# Patient Record
Sex: FEMALE | Race: WHITE | NOT HISPANIC OR LATINO
[De-identification: names, ages, dates, MRNs, and addresses within clinical notes are randomized per-mention and may not be internally consistent; named-entity substitution may affect disease eponyms.]

---

## 2017-01-17 ENCOUNTER — APPOINTMENT (OUTPATIENT)
Dept: PSYCHIATRY | Facility: CLINIC | Age: 52
End: 2017-01-17

## 2017-02-23 ENCOUNTER — APPOINTMENT (OUTPATIENT)
Dept: INTERNAL MEDICINE | Facility: CLINIC | Age: 52
End: 2017-02-23

## 2017-02-23 VITALS
HEIGHT: 65 IN | BODY MASS INDEX: 34.66 KG/M2 | SYSTOLIC BLOOD PRESSURE: 142 MMHG | WEIGHT: 208 LBS | DIASTOLIC BLOOD PRESSURE: 90 MMHG | HEART RATE: 93 BPM | OXYGEN SATURATION: 99 %

## 2017-02-23 DIAGNOSIS — R23.8 OTHER SKIN CHANGES: ICD-10-CM

## 2017-02-24 VITALS — DIASTOLIC BLOOD PRESSURE: 92 MMHG | SYSTOLIC BLOOD PRESSURE: 134 MMHG

## 2017-03-06 LAB
ALBUMIN SERPL ELPH-MCNC: 4.2 G/DL
ALP BLD-CCNC: 119 U/L
ALT SERPL-CCNC: 27 U/L
ANION GAP SERPL CALC-SCNC: 16 MMOL/L
APTT BLD: 31.8 SEC
AST SERPL-CCNC: 25 U/L
BASOPHILS # BLD AUTO: 0.02 K/UL
BASOPHILS NFR BLD AUTO: 0.2 %
BILIRUB SERPL-MCNC: 0.3 MG/DL
BUN SERPL-MCNC: 8 MG/DL
CALCIUM SERPL-MCNC: 9.1 MG/DL
CHLORIDE SERPL-SCNC: 104 MMOL/L
CHOLEST SERPL-MCNC: 279 MG/DL
CHOLEST/HDLC SERPL: 2.9 RATIO
CO2 SERPL-SCNC: 22 MMOL/L
CREAT SERPL-MCNC: 0.64 MG/DL
EOSINOPHIL # BLD AUTO: 0.4 K/UL
EOSINOPHIL NFR BLD AUTO: 4.7 %
GLUCOSE SERPL-MCNC: 103 MG/DL
HBA1C MFR BLD HPLC: 5.4 %
HCT VFR BLD CALC: 45 %
HDLC SERPL-MCNC: 96 MG/DL
HGB BLD-MCNC: 14.3 G/DL
IMM GRANULOCYTES NFR BLD AUTO: 0.1 %
INR PPP: 0.95 RATIO
LDLC SERPL CALC-MCNC: 161 MG/DL
LYMPHOCYTES # BLD AUTO: 2.05 K/UL
LYMPHOCYTES NFR BLD AUTO: 23.9 %
MAN DIFF?: NORMAL
MCHC RBC-ENTMCNC: 31.8 GM/DL
MCHC RBC-ENTMCNC: 32.9 PG
MCV RBC AUTO: 103.7 FL
MONOCYTES # BLD AUTO: 0.67 K/UL
MONOCYTES NFR BLD AUTO: 7.8 %
NEUTROPHILS # BLD AUTO: 5.41 K/UL
NEUTROPHILS NFR BLD AUTO: 63.3 %
PLATELET # BLD AUTO: 288 K/UL
POTASSIUM SERPL-SCNC: 5 MMOL/L
PROT SERPL-MCNC: 7.1 G/DL
PT BLD: 10.7 SEC
RBC # BLD: 4.34 M/UL
RBC # FLD: 13.5 %
SODIUM SERPL-SCNC: 142 MMOL/L
TRIGL SERPL-MCNC: 112 MG/DL
WBC # FLD AUTO: 8.56 K/UL

## 2017-03-22 ENCOUNTER — APPOINTMENT (OUTPATIENT)
Dept: INTERNAL MEDICINE | Facility: CLINIC | Age: 52
End: 2017-03-22

## 2017-03-22 VITALS — DIASTOLIC BLOOD PRESSURE: 98 MMHG | SYSTOLIC BLOOD PRESSURE: 132 MMHG

## 2017-03-22 VITALS
HEART RATE: 100 BPM | BODY MASS INDEX: 34.49 KG/M2 | DIASTOLIC BLOOD PRESSURE: 85 MMHG | OXYGEN SATURATION: 98 % | WEIGHT: 207 LBS | HEIGHT: 65 IN | SYSTOLIC BLOOD PRESSURE: 142 MMHG

## 2017-04-27 ENCOUNTER — RX RENEWAL (OUTPATIENT)
Age: 52
End: 2017-04-27

## 2017-05-07 ENCOUNTER — RX RENEWAL (OUTPATIENT)
Age: 52
End: 2017-05-07

## 2017-07-28 ENCOUNTER — APPOINTMENT (OUTPATIENT)
Dept: PSYCHIATRY | Facility: CLINIC | Age: 52
End: 2017-07-28

## 2017-07-28 ENCOUNTER — APPOINTMENT (OUTPATIENT)
Dept: INTERNAL MEDICINE | Facility: CLINIC | Age: 52
End: 2017-07-28

## 2017-08-01 ENCOUNTER — APPOINTMENT (OUTPATIENT)
Dept: PSYCHIATRY | Facility: CLINIC | Age: 52
End: 2017-08-01

## 2017-10-24 ENCOUNTER — MOBILE ON CALL (OUTPATIENT)
Age: 52
End: 2017-10-24

## 2018-01-03 ENCOUNTER — TRANSCRIPTION ENCOUNTER (OUTPATIENT)
Age: 53
End: 2018-01-03

## 2018-01-19 ENCOUNTER — APPOINTMENT (OUTPATIENT)
Dept: INTERNAL MEDICINE | Facility: CLINIC | Age: 53
End: 2018-01-19
Payer: COMMERCIAL

## 2018-01-19 VITALS
OXYGEN SATURATION: 98 % | WEIGHT: 209 LBS | SYSTOLIC BLOOD PRESSURE: 124 MMHG | HEART RATE: 110 BPM | DIASTOLIC BLOOD PRESSURE: 80 MMHG | BODY MASS INDEX: 34.82 KG/M2 | HEIGHT: 65 IN

## 2018-01-19 PROCEDURE — 99214 OFFICE O/P EST MOD 30 MIN: CPT | Mod: 25

## 2018-01-19 PROCEDURE — G0008: CPT

## 2018-01-19 PROCEDURE — 90686 IIV4 VACC NO PRSV 0.5 ML IM: CPT

## 2018-02-20 ENCOUNTER — APPOINTMENT (OUTPATIENT)
Dept: CARDIOLOGY | Facility: CLINIC | Age: 53
End: 2018-02-20
Payer: COMMERCIAL

## 2018-02-20 ENCOUNTER — NON-APPOINTMENT (OUTPATIENT)
Age: 53
End: 2018-02-20

## 2018-02-20 VITALS — DIASTOLIC BLOOD PRESSURE: 92 MMHG | SYSTOLIC BLOOD PRESSURE: 144 MMHG

## 2018-02-20 VITALS
HEIGHT: 65 IN | WEIGHT: 210 LBS | OXYGEN SATURATION: 97 % | DIASTOLIC BLOOD PRESSURE: 98 MMHG | BODY MASS INDEX: 34.99 KG/M2 | SYSTOLIC BLOOD PRESSURE: 137 MMHG | HEART RATE: 127 BPM

## 2018-02-20 DIAGNOSIS — F41.0 PANIC DISORDER [EPISODIC PAROXYSMAL ANXIETY]: ICD-10-CM

## 2018-02-20 DIAGNOSIS — F43.10 POST-TRAUMATIC STRESS DISORDER, UNSPECIFIED: ICD-10-CM

## 2018-02-20 DIAGNOSIS — Z87.891 PERSONAL HISTORY OF NICOTINE DEPENDENCE: ICD-10-CM

## 2018-02-20 DIAGNOSIS — Z86.59 PERSONAL HISTORY OF OTHER MENTAL AND BEHAVIORAL DISORDERS: ICD-10-CM

## 2018-02-20 DIAGNOSIS — Z81.8 FAMILY HISTORY OF OTHER MENTAL AND BEHAVIORAL DISORDERS: ICD-10-CM

## 2018-02-20 PROCEDURE — 99401 PREV MED CNSL INDIV APPRX 15: CPT

## 2018-02-20 PROCEDURE — 99245 OFF/OP CONSLTJ NEW/EST HI 55: CPT

## 2018-02-20 PROCEDURE — 93000 ELECTROCARDIOGRAM COMPLETE: CPT

## 2018-03-01 ENCOUNTER — APPOINTMENT (OUTPATIENT)
Dept: PSYCHIATRY | Facility: CLINIC | Age: 53
End: 2018-03-01
Payer: COMMERCIAL

## 2018-03-01 VITALS — DIASTOLIC BLOOD PRESSURE: 99 MMHG | SYSTOLIC BLOOD PRESSURE: 139 MMHG | HEART RATE: 105 BPM

## 2018-03-01 PROCEDURE — 99214 OFFICE O/P EST MOD 30 MIN: CPT

## 2018-03-05 ENCOUNTER — RX RENEWAL (OUTPATIENT)
Age: 53
End: 2018-03-05

## 2018-03-07 LAB
25(OH)D3 SERPL-MCNC: 4.5 NG/ML
ALBUMIN SERPL ELPH-MCNC: 4.1 G/DL
ALP BLD-CCNC: 118 U/L
ALT SERPL-CCNC: 40 U/L
ANION GAP SERPL CALC-SCNC: 14 MMOL/L
AST SERPL-CCNC: 34 U/L
BASOPHILS # BLD AUTO: 0.01 K/UL
BASOPHILS NFR BLD AUTO: 0.2 %
BILIRUB SERPL-MCNC: 0.2 MG/DL
BUN SERPL-MCNC: 6 MG/DL
CALCIUM SERPL-MCNC: 8.9 MG/DL
CHLORIDE SERPL-SCNC: 104 MMOL/L
CHOLEST SERPL-MCNC: 222 MG/DL
CHOLEST/HDLC SERPL: 2.7 RATIO
CO2 SERPL-SCNC: 25 MMOL/L
CREAT SERPL-MCNC: 0.57 MG/DL
EOSINOPHIL # BLD AUTO: 0.21 K/UL
EOSINOPHIL NFR BLD AUTO: 3.6 %
GLUCOSE SERPL-MCNC: 92 MG/DL
HBA1C MFR BLD HPLC: 5.4 %
HCT VFR BLD CALC: 42.6 %
HDLC SERPL-MCNC: 82 MG/DL
HGB BLD-MCNC: 13.7 G/DL
IMM GRANULOCYTES NFR BLD AUTO: 0.3 %
LDLC SERPL CALC-MCNC: 116 MG/DL
LYMPHOCYTES # BLD AUTO: 2.34 K/UL
LYMPHOCYTES NFR BLD AUTO: 40.4 %
MAN DIFF?: NORMAL
MCHC RBC-ENTMCNC: 32.2 GM/DL
MCHC RBC-ENTMCNC: 33.5 PG
MCV RBC AUTO: 104.2 FL
MONOCYTES # BLD AUTO: 0.56 K/UL
MONOCYTES NFR BLD AUTO: 9.7 %
NEUTROPHILS # BLD AUTO: 2.65 K/UL
NEUTROPHILS NFR BLD AUTO: 45.8 %
PLATELET # BLD AUTO: 293 K/UL
POTASSIUM SERPL-SCNC: 4.1 MMOL/L
PROT SERPL-MCNC: 6.9 G/DL
RBC # BLD: 4.09 M/UL
RBC # FLD: 13.3 %
SODIUM SERPL-SCNC: 143 MMOL/L
TRIGL SERPL-MCNC: 121 MG/DL
TSH SERPL-ACNC: 4.73 UIU/ML
WBC # FLD AUTO: 5.79 K/UL

## 2018-03-12 ENCOUNTER — NON-APPOINTMENT (OUTPATIENT)
Age: 53
End: 2018-03-12

## 2018-03-16 ENCOUNTER — OTHER (OUTPATIENT)
Age: 53
End: 2018-03-16

## 2018-04-05 ENCOUNTER — APPOINTMENT (OUTPATIENT)
Dept: CV DIAGNOSITCS | Facility: HOSPITAL | Age: 53
End: 2018-04-05

## 2018-04-05 ENCOUNTER — OUTPATIENT (OUTPATIENT)
Dept: OUTPATIENT SERVICES | Facility: HOSPITAL | Age: 53
LOS: 1 days | End: 2018-04-05
Payer: COMMERCIAL

## 2018-04-05 DIAGNOSIS — Z98.89 OTHER SPECIFIED POSTPROCEDURAL STATES: Chronic | ICD-10-CM

## 2018-04-05 DIAGNOSIS — I47.1 SUPRAVENTRICULAR TACHYCARDIA: ICD-10-CM

## 2018-04-05 DIAGNOSIS — S02.609A FRACTURE OF MANDIBLE, UNSPECIFIED, INITIAL ENCOUNTER FOR CLOSED FRACTURE: Chronic | ICD-10-CM

## 2018-04-05 PROCEDURE — 93306 TTE W/DOPPLER COMPLETE: CPT

## 2018-04-05 PROCEDURE — 93306 TTE W/DOPPLER COMPLETE: CPT | Mod: 26

## 2018-04-11 ENCOUNTER — APPOINTMENT (OUTPATIENT)
Dept: CARDIOLOGY | Facility: CLINIC | Age: 53
End: 2018-04-11

## 2018-04-16 ENCOUNTER — APPOINTMENT (OUTPATIENT)
Dept: INTERNAL MEDICINE | Facility: CLINIC | Age: 53
End: 2018-04-16
Payer: COMMERCIAL

## 2018-04-16 VITALS
OXYGEN SATURATION: 97 % | BODY MASS INDEX: 34.82 KG/M2 | HEIGHT: 65 IN | HEART RATE: 91 BPM | DIASTOLIC BLOOD PRESSURE: 80 MMHG | SYSTOLIC BLOOD PRESSURE: 136 MMHG | WEIGHT: 209 LBS

## 2018-04-16 VITALS — SYSTOLIC BLOOD PRESSURE: 122 MMHG | HEART RATE: 88 BPM | DIASTOLIC BLOOD PRESSURE: 80 MMHG

## 2018-04-16 DIAGNOSIS — E07.9 DISORDER OF THYROID, UNSPECIFIED: ICD-10-CM

## 2018-04-16 PROCEDURE — 36415 COLL VENOUS BLD VENIPUNCTURE: CPT

## 2018-04-16 PROCEDURE — 99214 OFFICE O/P EST MOD 30 MIN: CPT | Mod: 25

## 2018-04-23 ENCOUNTER — APPOINTMENT (OUTPATIENT)
Dept: PSYCHIATRY | Facility: CLINIC | Age: 53
End: 2018-04-23
Payer: COMMERCIAL

## 2018-04-23 LAB
25(OH)D3 SERPL-MCNC: 12.3 NG/ML
T4 FREE SERPL-MCNC: 0.9 NG/DL
THYROGLOB AB SERPL-ACNC: <20 IU/ML
THYROPEROXIDASE AB SERPL IA-ACNC: <10 IU/ML
TSH SERPL-ACNC: 4.74 UIU/ML

## 2018-04-23 PROCEDURE — 99214 OFFICE O/P EST MOD 30 MIN: CPT

## 2018-04-24 ENCOUNTER — TRANSCRIPTION ENCOUNTER (OUTPATIENT)
Age: 53
End: 2018-04-24

## 2018-04-27 LAB — HEMOCCULT STL QL IA: NEGATIVE

## 2018-05-03 ENCOUNTER — RX RENEWAL (OUTPATIENT)
Age: 53
End: 2018-05-03

## 2018-05-03 RX ORDER — VENLAFAXINE 37.5 MG/1
37.5 TABLET, EXTENDED RELEASE ORAL
Qty: 90 | Refills: 0 | Status: DISCONTINUED | COMMUNITY
Start: 2018-04-23 | End: 2018-05-03

## 2018-05-21 ENCOUNTER — RESULT REVIEW (OUTPATIENT)
Age: 53
End: 2018-05-21

## 2018-06-04 ENCOUNTER — APPOINTMENT (OUTPATIENT)
Dept: PSYCHIATRY | Facility: CLINIC | Age: 53
End: 2018-06-04
Payer: COMMERCIAL

## 2018-06-04 DIAGNOSIS — F41.9 ANXIETY DISORDER, UNSPECIFIED: ICD-10-CM

## 2018-06-04 DIAGNOSIS — F32.9 MAJOR DEPRESSIVE DISORDER, SINGLE EPISODE, UNSPECIFIED: ICD-10-CM

## 2018-06-04 PROCEDURE — 99214 OFFICE O/P EST MOD 30 MIN: CPT

## 2018-06-05 ENCOUNTER — FORM ENCOUNTER (OUTPATIENT)
Age: 53
End: 2018-06-05

## 2018-06-06 ENCOUNTER — APPOINTMENT (OUTPATIENT)
Dept: MAMMOGRAPHY | Facility: IMAGING CENTER | Age: 53
End: 2018-06-06
Payer: COMMERCIAL

## 2018-06-06 ENCOUNTER — OUTPATIENT (OUTPATIENT)
Dept: OUTPATIENT SERVICES | Facility: HOSPITAL | Age: 53
LOS: 1 days | End: 2018-06-06
Payer: COMMERCIAL

## 2018-06-06 ENCOUNTER — APPOINTMENT (OUTPATIENT)
Dept: ULTRASOUND IMAGING | Facility: IMAGING CENTER | Age: 53
End: 2018-06-06
Payer: COMMERCIAL

## 2018-06-06 DIAGNOSIS — N60.19 DIFFUSE CYSTIC MASTOPATHY OF UNSPECIFIED BREAST: ICD-10-CM

## 2018-06-06 DIAGNOSIS — Z98.89 OTHER SPECIFIED POSTPROCEDURAL STATES: Chronic | ICD-10-CM

## 2018-06-06 DIAGNOSIS — S02.609A FRACTURE OF MANDIBLE, UNSPECIFIED, INITIAL ENCOUNTER FOR CLOSED FRACTURE: Chronic | ICD-10-CM

## 2018-06-06 PROCEDURE — 76641 ULTRASOUND BREAST COMPLETE: CPT | Mod: 26,50

## 2018-06-06 PROCEDURE — 77067 SCR MAMMO BI INCL CAD: CPT | Mod: 26

## 2018-06-06 PROCEDURE — 77063 BREAST TOMOSYNTHESIS BI: CPT | Mod: 26

## 2018-06-06 PROCEDURE — 77063 BREAST TOMOSYNTHESIS BI: CPT

## 2018-06-06 PROCEDURE — 77067 SCR MAMMO BI INCL CAD: CPT

## 2018-06-06 PROCEDURE — 76641 ULTRASOUND BREAST COMPLETE: CPT

## 2018-07-16 ENCOUNTER — APPOINTMENT (OUTPATIENT)
Dept: PSYCHIATRY | Facility: CLINIC | Age: 53
End: 2018-07-16
Payer: COMMERCIAL

## 2018-07-16 VITALS — DIASTOLIC BLOOD PRESSURE: 86 MMHG | HEART RATE: 76 BPM | SYSTOLIC BLOOD PRESSURE: 125 MMHG

## 2018-07-16 PROCEDURE — 99214 OFFICE O/P EST MOD 30 MIN: CPT

## 2018-08-20 ENCOUNTER — RX RENEWAL (OUTPATIENT)
Age: 53
End: 2018-08-20

## 2018-09-10 ENCOUNTER — APPOINTMENT (OUTPATIENT)
Dept: PSYCHIATRY | Facility: CLINIC | Age: 53
End: 2018-09-10
Payer: COMMERCIAL

## 2018-09-10 VITALS — DIASTOLIC BLOOD PRESSURE: 89 MMHG | HEART RATE: 95 BPM | SYSTOLIC BLOOD PRESSURE: 135 MMHG

## 2018-09-10 PROCEDURE — 99214 OFFICE O/P EST MOD 30 MIN: CPT

## 2018-11-21 ENCOUNTER — RX RENEWAL (OUTPATIENT)
Age: 53
End: 2018-11-21

## 2018-12-03 ENCOUNTER — APPOINTMENT (OUTPATIENT)
Dept: PSYCHIATRY | Facility: CLINIC | Age: 53
End: 2018-12-03
Payer: COMMERCIAL

## 2018-12-03 DIAGNOSIS — I47.1 SUPRAVENTRICULAR TACHYCARDIA: ICD-10-CM

## 2018-12-03 PROCEDURE — 99214 OFFICE O/P EST MOD 30 MIN: CPT

## 2019-01-24 ENCOUNTER — RX RENEWAL (OUTPATIENT)
Age: 54
End: 2019-01-24

## 2019-02-25 ENCOUNTER — APPOINTMENT (OUTPATIENT)
Dept: PSYCHIATRY | Facility: CLINIC | Age: 54
End: 2019-02-25
Payer: COMMERCIAL

## 2019-02-25 PROCEDURE — 99214 OFFICE O/P EST MOD 30 MIN: CPT

## 2019-02-28 ENCOUNTER — RX RENEWAL (OUTPATIENT)
Age: 54
End: 2019-02-28

## 2019-04-16 ENCOUNTER — APPOINTMENT (OUTPATIENT)
Dept: CARDIOLOGY | Facility: CLINIC | Age: 54
End: 2019-04-16

## 2019-06-06 ENCOUNTER — APPOINTMENT (OUTPATIENT)
Dept: PSYCHIATRY | Facility: CLINIC | Age: 54
End: 2019-06-06
Payer: COMMERCIAL

## 2019-06-06 DIAGNOSIS — F33.41 MAJOR DEPRESSIVE DISORDER, RECURRENT, IN PARTIAL REMISSION: ICD-10-CM

## 2019-06-06 DIAGNOSIS — F43.21 ADJUSTMENT DISORDER WITH DEPRESSED MOOD: ICD-10-CM

## 2019-06-06 PROCEDURE — 99214 OFFICE O/P EST MOD 30 MIN: CPT

## 2019-06-14 ENCOUNTER — APPOINTMENT (OUTPATIENT)
Dept: SURGICAL ONCOLOGY | Facility: CLINIC | Age: 54
End: 2019-06-14
Payer: COMMERCIAL

## 2019-06-14 VITALS
OXYGEN SATURATION: 98 % | BODY MASS INDEX: 32.99 KG/M2 | HEART RATE: 101 BPM | SYSTOLIC BLOOD PRESSURE: 137 MMHG | DIASTOLIC BLOOD PRESSURE: 95 MMHG | WEIGHT: 198 LBS | HEIGHT: 65 IN

## 2019-06-14 PROCEDURE — 99244 OFF/OP CNSLTJ NEW/EST MOD 40: CPT

## 2019-06-14 NOTE — ADDENDUM
[FreeTextEntry1] : I, Joseph Meehan, acted solely as a scribe for Dr. David Kate on this date 06/14/2019.

## 2019-06-14 NOTE — OB HISTORY
[Postmenopausal] : The patient is postmenopausal [Menarche Age ____] : menarche age [unfilled] [Menopause Age____] : age at menopause was [unfilled] [Approximately ___] : the LMP was approximately [unfilled]

## 2019-06-14 NOTE — HISTORY OF PRESENT ILLNESS
[de-identified] : 54 y/o female presents for initial consultation for annual breast exam. \par Patient last seen in 2015. \par \par Screening Bilateral MMG/US 6/6/2018: No mammographic or sonographic evidence of malignancy. BIRADS-1. \par \par Today she is without any complaints. \par Denies palpable breast masses, nipple discharge, nipple retraction/inversion, or skin changes. \par Denies breast pain. \par Denies constitutional symptoms. \par Denies fever or chills. \par She reports one previous benign left breast excisional biopsy.\par FMHx: mother and maternal aunt had breast cancer.

## 2019-06-14 NOTE — CONSULT LETTER
[Dear  ___] : Dear  [unfilled], [Consult Letter:] : I had the pleasure of evaluating your patient, [unfilled]. [Please see my note below.] : Please see my note below. [Sincerely,] : Sincerely, [FreeTextEntry3] : David Kate MD FACS

## 2019-06-14 NOTE — ASSESSMENT
[FreeTextEntry1] : Fibrocystic changes \par S/p benign left breast surgical biopsy in the past\par Normal physical examination \par BIRADS-1 breast imaging June 2018 \par Will get yearly breast imaging now \par RTO 1 year

## 2019-06-14 NOTE — PHYSICAL EXAM
[Normal] : supple, no neck mass and thyroid not enlarged [Normal Supraclavicular Lymph Nodes] : normal supraclavicular lymph nodes [Normal Neck Lymph Nodes] : normal neck lymph nodes  [Normal Groin Lymph Nodes] : normal groin lymph nodes [Normal Axillary Lymph Nodes] : normal axillary lymph nodes [Normal] : oriented to person, place and time, with appropriate affect [de-identified] : Right breast scar well healed. No masses or adenopathy bilaterally.

## 2019-06-30 ENCOUNTER — FORM ENCOUNTER (OUTPATIENT)
Age: 54
End: 2019-06-30

## 2019-07-01 ENCOUNTER — OUTPATIENT (OUTPATIENT)
Dept: OUTPATIENT SERVICES | Facility: HOSPITAL | Age: 54
LOS: 1 days | End: 2019-07-01
Payer: COMMERCIAL

## 2019-07-01 ENCOUNTER — APPOINTMENT (OUTPATIENT)
Dept: MAMMOGRAPHY | Facility: CLINIC | Age: 54
End: 2019-07-01
Payer: COMMERCIAL

## 2019-07-01 ENCOUNTER — FORM ENCOUNTER (OUTPATIENT)
Age: 54
End: 2019-07-01

## 2019-07-01 ENCOUNTER — APPOINTMENT (OUTPATIENT)
Dept: ULTRASOUND IMAGING | Facility: CLINIC | Age: 54
End: 2019-07-01
Payer: COMMERCIAL

## 2019-07-01 DIAGNOSIS — Z98.89 OTHER SPECIFIED POSTPROCEDURAL STATES: Chronic | ICD-10-CM

## 2019-07-01 DIAGNOSIS — S02.609A FRACTURE OF MANDIBLE, UNSPECIFIED, INITIAL ENCOUNTER FOR CLOSED FRACTURE: Chronic | ICD-10-CM

## 2019-07-01 DIAGNOSIS — N60.19 DIFFUSE CYSTIC MASTOPATHY OF UNSPECIFIED BREAST: ICD-10-CM

## 2019-07-01 DIAGNOSIS — Z00.8 ENCOUNTER FOR OTHER GENERAL EXAMINATION: ICD-10-CM

## 2019-07-01 PROCEDURE — 76641 ULTRASOUND BREAST COMPLETE: CPT | Mod: 26,50

## 2019-07-01 PROCEDURE — 77063 BREAST TOMOSYNTHESIS BI: CPT

## 2019-07-01 PROCEDURE — 77067 SCR MAMMO BI INCL CAD: CPT

## 2019-07-01 PROCEDURE — 77063 BREAST TOMOSYNTHESIS BI: CPT | Mod: 26

## 2019-07-01 PROCEDURE — 77067 SCR MAMMO BI INCL CAD: CPT | Mod: 26

## 2019-07-01 PROCEDURE — 76641 ULTRASOUND BREAST COMPLETE: CPT

## 2019-07-02 ENCOUNTER — APPOINTMENT (OUTPATIENT)
Dept: ULTRASOUND IMAGING | Facility: CLINIC | Age: 54
End: 2019-07-02

## 2019-07-02 ENCOUNTER — APPOINTMENT (OUTPATIENT)
Dept: MAMMOGRAPHY | Facility: CLINIC | Age: 54
End: 2019-07-02

## 2019-07-02 ENCOUNTER — OUTPATIENT (OUTPATIENT)
Dept: OUTPATIENT SERVICES | Facility: HOSPITAL | Age: 54
LOS: 1 days | End: 2019-07-02
Payer: COMMERCIAL

## 2019-07-02 DIAGNOSIS — Z98.89 OTHER SPECIFIED POSTPROCEDURAL STATES: Chronic | ICD-10-CM

## 2019-07-02 DIAGNOSIS — S02.609A FRACTURE OF MANDIBLE, UNSPECIFIED, INITIAL ENCOUNTER FOR CLOSED FRACTURE: Chronic | ICD-10-CM

## 2019-07-02 DIAGNOSIS — Z00.8 ENCOUNTER FOR OTHER GENERAL EXAMINATION: ICD-10-CM

## 2019-07-02 PROCEDURE — 76642 ULTRASOUND BREAST LIMITED: CPT

## 2019-07-02 PROCEDURE — 76642 ULTRASOUND BREAST LIMITED: CPT | Mod: 26,50

## 2019-07-03 ENCOUNTER — RESULT REVIEW (OUTPATIENT)
Age: 54
End: 2019-07-03

## 2019-07-05 ENCOUNTER — APPOINTMENT (OUTPATIENT)
Dept: ULTRASOUND IMAGING | Facility: CLINIC | Age: 54
End: 2019-07-05

## 2019-08-16 ENCOUNTER — APPOINTMENT (OUTPATIENT)
Dept: INTERNAL MEDICINE | Facility: CLINIC | Age: 54
End: 2019-08-16
Payer: COMMERCIAL

## 2019-08-16 VITALS
HEART RATE: 106 BPM | BODY MASS INDEX: 31.32 KG/M2 | SYSTOLIC BLOOD PRESSURE: 130 MMHG | DIASTOLIC BLOOD PRESSURE: 86 MMHG | HEIGHT: 65 IN | TEMPERATURE: 98.2 F | WEIGHT: 188 LBS | OXYGEN SATURATION: 99 %

## 2019-08-16 PROCEDURE — 99396 PREV VISIT EST AGE 40-64: CPT

## 2019-08-16 RX ORDER — PROPRANOLOL HYDROCHLORIDE 10 MG/1
10 TABLET ORAL 3 TIMES DAILY
Qty: 21 | Refills: 0 | Status: DISCONTINUED | COMMUNITY
Start: 2018-06-04 | End: 2019-08-16

## 2019-08-16 RX ORDER — ERGOCALCIFEROL 1.25 MG/1
1.25 MG CAPSULE, LIQUID FILLED ORAL
Qty: 16 | Refills: 0 | Status: DISCONTINUED | COMMUNITY
Start: 2018-03-07 | End: 2019-08-16

## 2019-08-16 RX ORDER — ERGOCALCIFEROL 1.25 MG/1
1.25 MG CAPSULE, LIQUID FILLED ORAL
Qty: 8 | Refills: 0 | Status: DISCONTINUED | COMMUNITY
Start: 2017-02-23 | End: 2019-08-16

## 2019-08-19 NOTE — HEALTH RISK ASSESSMENT
[Very Good] : ~his/her~  mood as very good [Yes] : Yes [Patient declined colonoscopy] : Patient declined colonoscopy [HIV test declined] : HIV test declined [Hepatitis C test declined] : Hepatitis C test declined [None] : None [Alone] : lives alone [Employed] : employed [Single] : single [Feels Safe at Home] : Feels safe at home [Fully functional (bathing, dressing, toileting, transferring, walking, feeding)] : Fully functional (bathing, dressing, toileting, transferring, walking, feeding) [Fully functional (using the telephone, shopping, preparing meals, housekeeping, doing laundry, using] : Fully functional and needs no help or supervision to perform IADLs (using the telephone, shopping, preparing meals, housekeeping, doing laundry, using transportation, managing medications and managing finances) [Reports changes in vision] : Reports changes in vision [Smoke Detector] : smoke detector [Carbon Monoxide Detector] : carbon monoxide detector [Safety elements used in home] : safety elements used in home [Seat Belt] :  uses seat belt [Sunscreen] : uses sunscreen [With Patient/Caregiver] : With Patient/Caregiver [Name: ___] : Health Care Proxy's Name: [unfilled]  [Relationship: ___] : Relationship: [unfilled] [No falls in past year] : Patient reported no falls in the past year [0] : 2) Feeling down, depressed, or hopeless: Not at all (0) [Patient reported mammogram was normal] : Patient reported mammogram was normal [Patient declined PAP Smear] : Patient declined PAP Smear [# Of Children ___] : has [unfilled] children [] : No [de-identified] : quit 23 yrs ago (1 PPD since 13 yo) [de-identified] : 2-3/d on wkds w dinner , not during wk [Change in mental status noted] : No change in mental status noted [Language] : denies difficulty with language [Reports changes in hearing] : Reports no changes in hearing [Sexually Active] : not sexually active [Reports changes in dental health] : Reports no changes in dental health [Guns at Home] : no guns at home [Travel to Developing Areas] : does not  travel to developing areas [Caregiver Concerns] : does not have caregiver concerns [MammogramDate] : 07/19 [de-identified] : no pets [de-identified] : needs reading glasses [FreeTextEntry2] :  [AdvancecareDate] : 08/16/19 [FreeTextEntry4] : will discuss

## 2019-08-19 NOTE — PHYSICAL EXAM
[No Acute Distress] : no acute distress [Well Nourished] : well nourished [Well Developed] : well developed [Well-Appearing] : well-appearing [Normal Sclera/Conjunctiva] : normal sclera/conjunctiva [PERRL] : pupils equal round and reactive to light [EOMI] : extraocular movements intact [Normal Outer Ear/Nose] : the outer ears and nose were normal in appearance [Normal Oropharynx] : the oropharynx was normal [No JVD] : no jugular venous distention [Supple] : supple [No Lymphadenopathy] : no lymphadenopathy [Thyroid Normal, No Nodules] : the thyroid was normal and there were no nodules present [No Respiratory Distress] : no respiratory distress  [Clear to Auscultation] : lungs were clear to auscultation bilaterally [No Accessory Muscle Use] : no accessory muscle use [Normal Rate] : normal rate  [No Murmur] : no murmur heard [Regular Rhythm] : with a regular rhythm [Normal S1, S2] : normal S1 and S2 [No Carotid Bruits] : no carotid bruits [No Abdominal Bruit] : a ~M bruit was not heard ~T in the abdomen [No Varicosities] : no varicosities [Pedal Pulses Present] : the pedal pulses are present [No Edema] : there was no peripheral edema [No Palpable Aorta] : no palpable aorta [Normal Appearance] : normal in appearance [No Extremity Clubbing/Cyanosis] : no extremity clubbing/cyanosis [No Axillary Lymphadenopathy] : no axillary lymphadenopathy [Soft] : abdomen soft [Non-distended] : non-distended [Non Tender] : non-tender [No HSM] : no HSM [No Masses] : no abdominal mass palpated [Normal Posterior Cervical Nodes] : no posterior cervical lymphadenopathy [Normal Bowel Sounds] : normal bowel sounds [No CVA Tenderness] : no CVA  tenderness [Normal Anterior Cervical Nodes] : no anterior cervical lymphadenopathy [No Spinal Tenderness] : no spinal tenderness [Grossly Normal Strength/Tone] : grossly normal strength/tone [No Joint Swelling] : no joint swelling [No Rash] : no rash [Coordination Grossly Intact] : coordination grossly intact [No Focal Deficits] : no focal deficits [Normal Gait] : normal gait [Deep Tendon Reflexes (DTR)] : deep tendon reflexes were 2+ and symmetric [Normal Affect] : the affect was normal [Normal Insight/Judgement] : insight and judgment were intact

## 2019-08-19 NOTE — HISTORY OF PRESENT ILLNESS
[FreeTextEntry1] : cpe [de-identified] : 55 yo woman w depression/anxiety/PTSD, ;\par htn, HLD, tachycardia\par overwt, fibrocystic breasts;\par f/h breast/ovar ca.\par \par Recently lost a good amt of wt thru diet/exer and feeling well.\par

## 2019-08-22 LAB
25(OH)D3 SERPL-MCNC: 4.1 NG/ML
ALBUMIN SERPL ELPH-MCNC: 4.1 G/DL
ALP BLD-CCNC: 207 U/L
ALT SERPL-CCNC: 81 U/L
ANION GAP SERPL CALC-SCNC: 21 MMOL/L
AST SERPL-CCNC: 141 U/L
BASOPHILS # BLD AUTO: 0.04 K/UL
BASOPHILS NFR BLD AUTO: 0.5 %
BILIRUB SERPL-MCNC: 0.5 MG/DL
BUN SERPL-MCNC: 5 MG/DL
CALCIUM SERPL-MCNC: 8.8 MG/DL
CHLORIDE SERPL-SCNC: 94 MMOL/L
CHOLEST SERPL-MCNC: 293 MG/DL
CHOLEST/HDLC SERPL: 2.1 RATIO
CO2 SERPL-SCNC: 24 MMOL/L
CREAT SERPL-MCNC: 0.53 MG/DL
EOSINOPHIL # BLD AUTO: 0.2 K/UL
EOSINOPHIL NFR BLD AUTO: 2.6 %
ESTIMATED AVERAGE GLUCOSE: 103 MG/DL
GLUCOSE SERPL-MCNC: 94 MG/DL
HBA1C MFR BLD HPLC: 5.2 %
HCT VFR BLD CALC: 48.1 %
HDLC SERPL-MCNC: 141 MG/DL
HGB BLD-MCNC: 16.1 G/DL
IMM GRANULOCYTES NFR BLD AUTO: 0.4 %
LDLC SERPL CALC-MCNC: 135 MG/DL
LYMPHOCYTES # BLD AUTO: 1.94 K/UL
LYMPHOCYTES NFR BLD AUTO: 25.3 %
MAN DIFF?: NORMAL
MCHC RBC-ENTMCNC: 33.5 GM/DL
MCHC RBC-ENTMCNC: 35 PG
MCV RBC AUTO: 104.6 FL
MONOCYTES # BLD AUTO: 0.66 K/UL
MONOCYTES NFR BLD AUTO: 8.6 %
NEUTROPHILS # BLD AUTO: 4.81 K/UL
NEUTROPHILS NFR BLD AUTO: 62.6 %
PLATELET # BLD AUTO: 294 K/UL
POTASSIUM SERPL-SCNC: 4.2 MMOL/L
PROT SERPL-MCNC: 6.9 G/DL
RBC # BLD: 4.6 M/UL
RBC # FLD: 13.2 %
SODIUM SERPL-SCNC: 139 MMOL/L
TRIGL SERPL-MCNC: 84 MG/DL
TSH SERPL-ACNC: 3.94 UIU/ML
VIT B12 SERPL-MCNC: 684 PG/ML
WBC # FLD AUTO: 7.68 K/UL

## 2019-08-26 LAB — HEMOCCULT STL QL IA: NEGATIVE

## 2019-08-27 ENCOUNTER — APPOINTMENT (OUTPATIENT)
Dept: PSYCHIATRY | Facility: CLINIC | Age: 54
End: 2019-08-27

## 2019-08-30 LAB
ALBUMIN SERPL ELPH-MCNC: 3.8 G/DL
ALP BLD-CCNC: 119 U/L
ALP BONE SERPL-MCNC: 16 MCG/L
ALT SERPL-CCNC: 35 U/L
ANION GAP SERPL CALC-SCNC: 13 MMOL/L
AST SERPL-CCNC: 36 U/L
BILIRUB SERPL-MCNC: 0.6 MG/DL
BUN SERPL-MCNC: 3 MG/DL
CALCIUM SERPL-MCNC: 9 MG/DL
CHLORIDE SERPL-SCNC: 100 MMOL/L
CHOLEST SERPL-MCNC: 205 MG/DL
CHOLEST/HDLC SERPL: 2.2 RATIO
CO2 SERPL-SCNC: 26 MMOL/L
CREAT SERPL-MCNC: 0.58 MG/DL
GLUCOSE SERPL-MCNC: 106 MG/DL
HBV SURFACE AB SER QL: NONREACTIVE
HBV SURFACE AG SER QL: NONREACTIVE
HCV AB SER QL: NONREACTIVE
HCV S/CO RATIO: 0.11 S/CO
HDLC SERPL-MCNC: 92 MG/DL
LDLC SERPL CALC-MCNC: 96 MG/DL
POTASSIUM SERPL-SCNC: 4.1 MMOL/L
PROT SERPL-MCNC: 6.5 G/DL
SODIUM SERPL-SCNC: 138 MMOL/L
TRIGL SERPL-MCNC: 84 MG/DL

## 2019-10-24 ENCOUNTER — APPOINTMENT (OUTPATIENT)
Dept: PSYCHIATRY | Facility: CLINIC | Age: 54
End: 2019-10-24

## 2020-02-04 ENCOUNTER — RX RENEWAL (OUTPATIENT)
Age: 55
End: 2020-02-04

## 2020-06-09 ENCOUNTER — APPOINTMENT (OUTPATIENT)
Dept: INTERNAL MEDICINE | Facility: CLINIC | Age: 55
End: 2020-06-09
Payer: COMMERCIAL

## 2020-06-09 DIAGNOSIS — E55.9 VITAMIN D DEFICIENCY, UNSPECIFIED: ICD-10-CM

## 2020-06-09 PROCEDURE — 99213 OFFICE O/P EST LOW 20 MIN: CPT | Mod: 95

## 2020-06-09 NOTE — HISTORY OF PRESENT ILLNESS
[Home] : at home, [unfilled] , at the time of the visit. [Medical Office: ([facility])___] : at the medical office located in  [Verbal consent obtained from patient] : the patient, [unfilled] [FreeTextEntry8] : 55 yo woman w Htn, HLD, tachycardia, overwt, fibrocyst breasts, h/o breast biopsies,  f/h breast ca.\par Low vit D .\par Acute issue:pain and limited ROM right shoulder x sev mos. She is left-handed. Doesn't recall any trauma or strain to shoulder.\par Initially had pain in wrist traveling up towards neck, due to wrong position with iPad while working from home. Saw her Chiro , that resolved. Has not  used any tylenol or advil etc. \par She would like to see Ortho Dr. Price.\par She is due for her Mammo/US\par Due for labs - elev lipids, elev LFTs, vit D 4 in the past.\par She lost 30 lb so far on plant-based diet on her own. wt = 175 , feels she has plateaued during pandemic.\par \par Staying safe duringCovid pandemic.\par Just before lockdown she was on flights to [place], had to turn around  in airport and come right back then self-quarantined; did not become sick.

## 2020-06-09 NOTE — ASSESSMENT
[FreeTextEntry1] : Pt w possible right  rotator cuff tear. \par She will see her Ortho Dr. Price.\par Otherwise doing well. Losing wt nicely, walking for exercise; working from home.\par Encouraged to drink more water, w rafiq.\par Mammo/US\par labs\par f/u 7.20.\par

## 2020-06-15 ENCOUNTER — APPOINTMENT (OUTPATIENT)
Dept: ORTHOPEDIC SURGERY | Facility: CLINIC | Age: 55
End: 2020-06-15
Payer: COMMERCIAL

## 2020-06-15 VITALS
SYSTOLIC BLOOD PRESSURE: 100 MMHG | WEIGHT: 175 LBS | DIASTOLIC BLOOD PRESSURE: 71 MMHG | HEART RATE: 72 BPM | HEIGHT: 65 IN | TEMPERATURE: 97.3 F | BODY MASS INDEX: 29.16 KG/M2

## 2020-06-15 DIAGNOSIS — M25.511 PAIN IN RIGHT SHOULDER: ICD-10-CM

## 2020-06-15 PROCEDURE — 99204 OFFICE O/P NEW MOD 45 MIN: CPT

## 2020-06-15 PROCEDURE — 72040 X-RAY EXAM NECK SPINE 2-3 VW: CPT

## 2020-06-15 PROCEDURE — 73030 X-RAY EXAM OF SHOULDER: CPT | Mod: RT

## 2020-07-01 ENCOUNTER — RX RENEWAL (OUTPATIENT)
Age: 55
End: 2020-07-01

## 2020-07-06 ENCOUNTER — APPOINTMENT (OUTPATIENT)
Dept: INTERNAL MEDICINE | Facility: CLINIC | Age: 55
End: 2020-07-06

## 2020-07-08 ENCOUNTER — APPOINTMENT (OUTPATIENT)
Dept: MAMMOGRAPHY | Facility: IMAGING CENTER | Age: 55
End: 2020-07-08
Payer: COMMERCIAL

## 2020-07-08 ENCOUNTER — OUTPATIENT (OUTPATIENT)
Dept: OUTPATIENT SERVICES | Facility: HOSPITAL | Age: 55
LOS: 1 days | End: 2020-07-08
Payer: COMMERCIAL

## 2020-07-08 ENCOUNTER — RESULT REVIEW (OUTPATIENT)
Age: 55
End: 2020-07-08

## 2020-07-08 ENCOUNTER — APPOINTMENT (OUTPATIENT)
Dept: ULTRASOUND IMAGING | Facility: IMAGING CENTER | Age: 55
End: 2020-07-08
Payer: COMMERCIAL

## 2020-07-08 DIAGNOSIS — Z00.8 ENCOUNTER FOR OTHER GENERAL EXAMINATION: ICD-10-CM

## 2020-07-08 DIAGNOSIS — Z98.89 OTHER SPECIFIED POSTPROCEDURAL STATES: Chronic | ICD-10-CM

## 2020-07-08 DIAGNOSIS — N60.19 DIFFUSE CYSTIC MASTOPATHY OF UNSPECIFIED BREAST: ICD-10-CM

## 2020-07-08 DIAGNOSIS — Z00.00 ENCOUNTER FOR GENERAL ADULT MEDICAL EXAMINATION WITHOUT ABNORMAL FINDINGS: ICD-10-CM

## 2020-07-08 DIAGNOSIS — S02.609A FRACTURE OF MANDIBLE, UNSPECIFIED, INITIAL ENCOUNTER FOR CLOSED FRACTURE: Chronic | ICD-10-CM

## 2020-07-08 PROCEDURE — 76641 ULTRASOUND BREAST COMPLETE: CPT

## 2020-07-08 PROCEDURE — 77067 SCR MAMMO BI INCL CAD: CPT

## 2020-07-08 PROCEDURE — 77067 SCR MAMMO BI INCL CAD: CPT | Mod: 26

## 2020-07-08 PROCEDURE — 77063 BREAST TOMOSYNTHESIS BI: CPT | Mod: 26

## 2020-07-08 PROCEDURE — 77063 BREAST TOMOSYNTHESIS BI: CPT

## 2020-07-08 PROCEDURE — 76641 ULTRASOUND BREAST COMPLETE: CPT | Mod: 26,50

## 2020-07-17 ENCOUNTER — APPOINTMENT (OUTPATIENT)
Dept: ORTHOPEDIC SURGERY | Facility: CLINIC | Age: 55
End: 2020-07-17

## 2020-07-29 ENCOUNTER — APPOINTMENT (OUTPATIENT)
Dept: SURGICAL ONCOLOGY | Facility: CLINIC | Age: 55
End: 2020-07-29
Payer: COMMERCIAL

## 2020-07-29 VITALS
DIASTOLIC BLOOD PRESSURE: 72 MMHG | TEMPERATURE: 97.9 F | HEART RATE: 90 BPM | SYSTOLIC BLOOD PRESSURE: 104 MMHG | OXYGEN SATURATION: 97 % | RESPIRATION RATE: 14 BRPM

## 2020-07-29 PROCEDURE — 99214 OFFICE O/P EST MOD 30 MIN: CPT

## 2020-07-29 NOTE — CONSULT LETTER
[Dear  ___] : Dear  [unfilled], [Please see my note below.] : Please see my note below. [Consult Letter:] : I had the pleasure of evaluating your patient, [unfilled]. [Sincerely,] : Sincerely, [FreeTextEntry3] : David Kate MD FACS

## 2020-07-29 NOTE — ASSESSMENT
[FreeTextEntry1] : Strong family history of breast cancer. \par Lifetime risk 31%\par BIRADS-2/1 breast imaging June 2020.\par Will hold off off on breast MRI until January 2021 as pt has a frozen shoulder. \par RTO 6 mos\par \par

## 2020-07-29 NOTE — PHYSICAL EXAM
[Normal] : supple, no neck mass and thyroid not enlarged [Normal Neck Lymph Nodes] : normal neck lymph nodes  [Normal Supraclavicular Lymph Nodes] : normal supraclavicular lymph nodes [Normal Axillary Lymph Nodes] : normal axillary lymph nodes [Normal Groin Lymph Nodes] : normal groin lymph nodes [Normal] : oriented to person, place and time, with appropriate affect [de-identified] : Left breast scar well healed. No masses or adenopathy bilaterally.

## 2020-07-29 NOTE — HISTORY OF PRESENT ILLNESS
[de-identified] : 52 y/o female presents for a follow up for annual breast exam. \par \par Screening Bilateral MMG/US 6/22/20: No mammographic or sonographic evidence of malignancy. BIRADS-2 \par Screening Bilateral MMG/US 6/6/2018: No mammographic or sonographic evidence of malignancy. BIRADS-1. \par \par Notes she has right shoulder stiffness. Is seen by Dr. Sparrow. \par \par Today she is without complaints. \par Denies palpable breast masses, nipple discharge, nipple retraction/inversion, or skin changes. \par Denies breast pain. \par Denies constitutional symptoms. \par Denies fever or chills. \par She reports one previous benign left breast excisional biopsy.\par FMHx: mother and maternal aunt had breast cancer.

## 2020-08-25 ENCOUNTER — APPOINTMENT (OUTPATIENT)
Dept: INTERNAL MEDICINE | Facility: CLINIC | Age: 55
End: 2020-08-25
Payer: COMMERCIAL

## 2020-08-25 VITALS
HEIGHT: 65 IN | HEART RATE: 86 BPM | TEMPERATURE: 97.5 F | DIASTOLIC BLOOD PRESSURE: 78 MMHG | OXYGEN SATURATION: 99 % | SYSTOLIC BLOOD PRESSURE: 115 MMHG | WEIGHT: 180 LBS | BODY MASS INDEX: 29.99 KG/M2

## 2020-08-25 LAB
25(OH)D3 SERPL-MCNC: 19.3 NG/ML
ALBUMIN SERPL ELPH-MCNC: 4.4 G/DL
ALP BLD-CCNC: 134 U/L
ALT SERPL-CCNC: 26 U/L
ANION GAP SERPL CALC-SCNC: 13 MMOL/L
AST SERPL-CCNC: 19 U/L
BASOPHILS # BLD AUTO: 0.04 K/UL
BASOPHILS NFR BLD AUTO: 0.6 %
BILIRUB SERPL-MCNC: 0.3 MG/DL
BUN SERPL-MCNC: 8 MG/DL
CALCIUM SERPL-MCNC: 9.6 MG/DL
CHLORIDE SERPL-SCNC: 106 MMOL/L
CHOLEST SERPL-MCNC: 189 MG/DL
CHOLEST/HDLC SERPL: 3.2 RATIO
CO2 SERPL-SCNC: 25 MMOL/L
CREAT SERPL-MCNC: 0.67 MG/DL
EOSINOPHIL # BLD AUTO: 0.23 K/UL
EOSINOPHIL NFR BLD AUTO: 3.6 %
ESTIMATED AVERAGE GLUCOSE: 97 MG/DL
GLUCOSE SERPL-MCNC: 95 MG/DL
HBA1C MFR BLD HPLC: 5 %
HCT VFR BLD CALC: 41.2 %
HDLC SERPL-MCNC: 58 MG/DL
HGB BLD-MCNC: 13.5 G/DL
IMM GRANULOCYTES NFR BLD AUTO: 0.2 %
LDLC SERPL CALC-MCNC: 115 MG/DL
LYMPHOCYTES # BLD AUTO: 2 K/UL
LYMPHOCYTES NFR BLD AUTO: 31.3 %
MAN DIFF?: NORMAL
MCHC RBC-ENTMCNC: 30.8 PG
MCHC RBC-ENTMCNC: 32.8 GM/DL
MCV RBC AUTO: 93.8 FL
MONOCYTES # BLD AUTO: 0.48 K/UL
MONOCYTES NFR BLD AUTO: 7.5 %
NEUTROPHILS # BLD AUTO: 3.64 K/UL
NEUTROPHILS NFR BLD AUTO: 56.8 %
PLATELET # BLD AUTO: 299 K/UL
POTASSIUM SERPL-SCNC: 4.7 MMOL/L
PROT SERPL-MCNC: 6.5 G/DL
RBC # BLD: 4.39 M/UL
RBC # FLD: 12.4 %
SODIUM SERPL-SCNC: 144 MMOL/L
TRIGL SERPL-MCNC: 79 MG/DL
TSH SERPL-ACNC: 9.25 UIU/ML
WBC # FLD AUTO: 6.4 K/UL

## 2020-08-25 PROCEDURE — 99213 OFFICE O/P EST LOW 20 MIN: CPT

## 2020-08-25 RX ORDER — LEVOTHYROXINE SODIUM 0.05 MG/1
50 TABLET ORAL
Qty: 90 | Refills: 3 | Status: ACTIVE | COMMUNITY
Start: 2020-08-25 | End: 1900-01-01

## 2020-08-25 NOTE — ASSESSMENT
[FreeTextEntry1] : New hypothy\par repeat labs and abs\par covid\par start synthr 50\par f/u 6-8 wks

## 2020-08-25 NOTE — PHYSICAL EXAM
[No Acute Distress] : no acute distress [Well-Appearing] : well-appearing [Well Developed] : well developed [Well Nourished] : well nourished [PERRL] : pupils equal round and reactive to light [Normal Sclera/Conjunctiva] : normal sclera/conjunctiva [EOMI] : extraocular movements intact [Normal Outer Ear/Nose] : the outer ears and nose were normal in appearance [Normal Oropharynx] : the oropharynx was normal [No JVD] : no jugular venous distention [No Lymphadenopathy] : no lymphadenopathy [Supple] : supple [Thyroid Normal, No Nodules] : the thyroid was normal and there were no nodules present [No Respiratory Distress] : no respiratory distress  [No Accessory Muscle Use] : no accessory muscle use [Clear to Auscultation] : lungs were clear to auscultation bilaterally [Normal Rate] : normal rate  [Normal S1, S2] : normal S1 and S2 [Regular Rhythm] : with a regular rhythm [No Murmur] : no murmur heard [No Carotid Bruits] : no carotid bruits [No Abdominal Bruit] : a ~M bruit was not heard ~T in the abdomen [Pedal Pulses Present] : the pedal pulses are present [No Varicosities] : no varicosities [No Edema] : there was no peripheral edema [Soft] : abdomen soft [No Extremity Clubbing/Cyanosis] : no extremity clubbing/cyanosis [No Palpable Aorta] : no palpable aorta [Non Tender] : non-tender [Non-distended] : non-distended [No Masses] : no abdominal mass palpated [No HSM] : no HSM [Normal Bowel Sounds] : normal bowel sounds [Normal Posterior Cervical Nodes] : no posterior cervical lymphadenopathy [No CVA Tenderness] : no CVA  tenderness [Normal Anterior Cervical Nodes] : no anterior cervical lymphadenopathy [No Spinal Tenderness] : no spinal tenderness [No Joint Swelling] : no joint swelling [No Rash] : no rash [Grossly Normal Strength/Tone] : grossly normal strength/tone [Normal Gait] : normal gait [No Focal Deficits] : no focal deficits [Coordination Grossly Intact] : coordination grossly intact [Normal Affect] : the affect was normal [Normal Insight/Judgement] : insight and judgment were intact

## 2020-08-25 NOTE — HISTORY OF PRESENT ILLNESS
[FreeTextEntry1] : f/u [de-identified] : re hypothy new\par recent labs TSH 9\par no sxs other than fatigue which feels the same\par \par went off all meds in March\par working w psych to resume, so far on ly on Lamictal\par \par changed diet-gave up meat, dairy\par off statin, lipids so far ok\par

## 2020-08-26 LAB
SARS-COV-2 IGG SERPL IA-ACNC: <0.1 INDEX
SARS-COV-2 IGG SERPL QL IA: NEGATIVE
T4 FREE SERPL-MCNC: 0.7 NG/DL
THYROGLOB AB SERPL-ACNC: <20 IU/ML
THYROPEROXIDASE AB SERPL IA-ACNC: <10 IU/ML
TSH SERPL-ACNC: 12.8 UIU/ML

## 2020-09-02 ENCOUNTER — APPOINTMENT (OUTPATIENT)
Dept: PSYCHIATRY | Facility: CLINIC | Age: 55
End: 2020-09-02
Payer: COMMERCIAL

## 2020-09-02 PROCEDURE — 99214 OFFICE O/P EST MOD 30 MIN: CPT

## 2020-09-03 ENCOUNTER — APPOINTMENT (OUTPATIENT)
Dept: INTERNAL MEDICINE | Facility: CLINIC | Age: 55
End: 2020-09-03

## 2020-09-04 ENCOUNTER — APPOINTMENT (OUTPATIENT)
Dept: INTERNAL MEDICINE | Facility: CLINIC | Age: 55
End: 2020-09-04
Payer: COMMERCIAL

## 2020-09-04 ENCOUNTER — TRANSCRIPTION ENCOUNTER (OUTPATIENT)
Age: 55
End: 2020-09-04

## 2020-09-04 VITALS
OXYGEN SATURATION: 98 % | HEART RATE: 100 BPM | BODY MASS INDEX: 29.66 KG/M2 | WEIGHT: 178 LBS | DIASTOLIC BLOOD PRESSURE: 75 MMHG | HEIGHT: 65 IN | SYSTOLIC BLOOD PRESSURE: 134 MMHG

## 2020-09-04 DIAGNOSIS — Z23 ENCOUNTER FOR IMMUNIZATION: ICD-10-CM

## 2020-09-04 DIAGNOSIS — M75.01 ADHESIVE CAPSULITIS OF RIGHT SHOULDER: ICD-10-CM

## 2020-09-04 PROCEDURE — 90686 IIV4 VACC NO PRSV 0.5 ML IM: CPT

## 2020-09-04 PROCEDURE — 99396 PREV VISIT EST AGE 40-64: CPT | Mod: 25

## 2020-09-04 PROCEDURE — G0008: CPT

## 2020-09-04 RX ORDER — MELOXICAM 15 MG/1
15 TABLET ORAL DAILY
Qty: 30 | Refills: 0 | Status: DISCONTINUED | COMMUNITY
Start: 2020-06-15 | End: 2020-09-04

## 2020-09-04 RX ORDER — ATORVASTATIN CALCIUM 20 MG/1
20 TABLET, FILM COATED ORAL DAILY
Qty: 90 | Refills: 3 | Status: DISCONTINUED | COMMUNITY
Start: 2017-03-06 | End: 2020-09-04

## 2020-09-04 RX ORDER — ERGOCALCIFEROL 1.25 MG/1
1.25 MG CAPSULE, LIQUID FILLED ORAL
Qty: 12 | Refills: 2 | Status: DISCONTINUED | COMMUNITY
Start: 2019-08-22 | End: 2020-09-04

## 2020-09-04 RX ORDER — AMLODIPINE BESYLATE 5 MG/1
5 TABLET ORAL DAILY
Qty: 90 | Refills: 2 | Status: DISCONTINUED | COMMUNITY
Start: 2017-02-23 | End: 2020-09-04

## 2020-09-04 RX ORDER — METOPROLOL SUCCINATE 25 MG/1
25 TABLET, EXTENDED RELEASE ORAL DAILY
Qty: 90 | Refills: 3 | Status: DISCONTINUED | COMMUNITY
Start: 2018-01-19 | End: 2020-09-04

## 2020-09-04 RX ORDER — VENLAFAXINE HYDROCHLORIDE 37.5 MG/1
37.5 CAPSULE, EXTENDED RELEASE ORAL
Qty: 90 | Refills: 0 | Status: DISCONTINUED | COMMUNITY
Start: 2018-05-03 | End: 2020-09-04

## 2020-09-04 RX ORDER — CLONAZEPAM 0.5 MG/1
0.5 TABLET ORAL
Qty: 20 | Refills: 0 | Status: DISCONTINUED | COMMUNITY
Start: 2018-03-01 | End: 2020-09-04

## 2020-09-04 NOTE — ASSESSMENT
[FreeTextEntry1] : Pt as described above.\par Flu shot given.\par Labs reviewed again.\par Add vit D3 2,000 u\par FIt testing\par  contin w psych MD and therapist Danielle Mckeon\par Contin efforts at diet/exercise.\par f/u 3 mos

## 2020-09-04 NOTE — HISTORY OF PRESENT ILLNESS
[FreeTextEntry1] : cpe [de-identified] : 54 yo woman w depression, PTSD, Htn, HLD, obesity, hypothyroidism, fibrocystic breasts s/p 2 biopsies \par Sees Dr. Kate, was there last mo\par f/h early Breast ca. - mother\par \par \par Recently in [place] for right adhesive capsultitis, saw Ortho Dr. Sparrow\par \par Pt had been losing wt on a good diet then hit a plateau, she believes might be related to recent dx hypothy\par \par Pt had recently d/cd all her meds during the pandemic, gradually resuming them- going up on Lamictal via Psych, on synthr, off her statin and anti-htn and labs/bp ok so far.\par \par Derm  uptodate\par Mammo: 7/20...consider MRI, Dr. Kate aware\par \par \par

## 2020-09-04 NOTE — PHYSICAL EXAM
[No Acute Distress] : no acute distress [Well Nourished] : well nourished [Well-Appearing] : well-appearing [Well Developed] : well developed [Normal Outer Ear/Nose] : the outer ears and nose were normal in appearance [EOMI] : extraocular movements intact [PERRL] : pupils equal round and reactive to light [Normal Sclera/Conjunctiva] : normal sclera/conjunctiva [Normal Oropharynx] : the oropharynx was normal [No JVD] : no jugular venous distention [Supple] : supple [No Lymphadenopathy] : no lymphadenopathy [No Respiratory Distress] : no respiratory distress  [No Accessory Muscle Use] : no accessory muscle use [Thyroid Normal, No Nodules] : the thyroid was normal and there were no nodules present [Clear to Auscultation] : lungs were clear to auscultation bilaterally [Regular Rhythm] : with a regular rhythm [Normal Rate] : normal rate  [No Murmur] : no murmur heard [Normal S1, S2] : normal S1 and S2 [Pedal Pulses Present] : the pedal pulses are present [No Carotid Bruits] : no carotid bruits [No Varicosities] : no varicosities [No Abdominal Bruit] : a ~M bruit was not heard ~T in the abdomen [No Edema] : there was no peripheral edema [No Extremity Clubbing/Cyanosis] : no extremity clubbing/cyanosis [No Palpable Aorta] : no palpable aorta [Non Tender] : non-tender [Soft] : abdomen soft [No Masses] : no abdominal mass palpated [Non-distended] : non-distended [No HSM] : no HSM [Normal Anterior Cervical Nodes] : no anterior cervical lymphadenopathy [Normal Posterior Cervical Nodes] : no posterior cervical lymphadenopathy [Normal Bowel Sounds] : normal bowel sounds [No Joint Swelling] : no joint swelling [No Spinal Tenderness] : no spinal tenderness [No CVA Tenderness] : no CVA  tenderness [Grossly Normal Strength/Tone] : grossly normal strength/tone [No Rash] : no rash [Normal Gait] : normal gait [Coordination Grossly Intact] : coordination grossly intact [No Focal Deficits] : no focal deficits [Normal Affect] : the affect was normal [Normal Insight/Judgement] : insight and judgment were intact

## 2020-09-17 LAB — HEMOCCULT STL QL IA: NEGATIVE

## 2020-10-08 ENCOUNTER — APPOINTMENT (OUTPATIENT)
Dept: PSYCHIATRY | Facility: CLINIC | Age: 55
End: 2020-10-08

## 2020-10-09 ENCOUNTER — RESULT REVIEW (OUTPATIENT)
Age: 55
End: 2020-10-09

## 2020-12-04 ENCOUNTER — APPOINTMENT (OUTPATIENT)
Dept: INTERNAL MEDICINE | Facility: CLINIC | Age: 55
End: 2020-12-04
Payer: COMMERCIAL

## 2020-12-04 VITALS
HEIGHT: 65 IN | DIASTOLIC BLOOD PRESSURE: 72 MMHG | OXYGEN SATURATION: 100 % | SYSTOLIC BLOOD PRESSURE: 104 MMHG | HEART RATE: 78 BPM | RESPIRATION RATE: 16 BRPM | WEIGHT: 182 LBS | BODY MASS INDEX: 30.32 KG/M2

## 2020-12-04 DIAGNOSIS — F60.3 BORDERLINE PERSONALITY DISORDER: ICD-10-CM

## 2020-12-04 DIAGNOSIS — F32.9 MAJOR DEPRESSIVE DISORDER, SINGLE EPISODE, UNSPECIFIED: ICD-10-CM

## 2020-12-04 PROCEDURE — 99213 OFFICE O/P EST LOW 20 MIN: CPT

## 2020-12-04 PROCEDURE — 99072 ADDL SUPL MATRL&STAF TM PHE: CPT

## 2020-12-04 NOTE — HISTORY OF PRESENT ILLNESS
[FreeTextEntry1] : f/u [de-identified] : 56 yo woman w depression, PTSD, Htn, HLD, obesity, hypothyroidism, fibrocystic breasts s/p 2 biopsies \par Sees Dr. Kate\par f/h early Breast ca. - mother\par \par \par \par Pt had recently d/cd all her meds during the pandemic, meds still being adjusted- going up on Lamictal via Psych, on synthr, off her statin and anti-htn and labs/bp ok so far.\par Seeing Endo Dr. Audrey Cerda, states TSH 4 last mo (but wants me to recheck today)\par Saw Gyn recently Dr. Pardo- ASGUS on pap- was told f/u 1 yr-would like me to check if that is standard.\par \par Derm  uptodate\par \par c/o left ankle swelling, no pain. no injury.\par \par

## 2020-12-04 NOTE — ASSESSMENT
[FreeTextEntry1] : pt as outlined.\par Requests TSH , also seeing Endo.\par Requests that I reach out to Gyn about her recent ASGUS on Pap.\par f/u 4 mos

## 2020-12-04 NOTE — PHYSICAL EXAM
[No Acute Distress] : no acute distress [Well Nourished] : well nourished [Well Developed] : well developed [Well-Appearing] : well-appearing [Normal Sclera/Conjunctiva] : normal sclera/conjunctiva [PERRL] : pupils equal round and reactive to light [EOMI] : extraocular movements intact [Normal Outer Ear/Nose] : the outer ears and nose were normal in appearance [Normal Oropharynx] : the oropharynx was normal [No JVD] : no jugular venous distention [No Lymphadenopathy] : no lymphadenopathy [Supple] : supple [Thyroid Normal, No Nodules] : the thyroid was normal and there were no nodules present [No Respiratory Distress] : no respiratory distress  [No Accessory Muscle Use] : no accessory muscle use [Clear to Auscultation] : lungs were clear to auscultation bilaterally [Normal Rate] : normal rate  [Regular Rhythm] : with a regular rhythm [Normal S1, S2] : normal S1 and S2 [No Murmur] : no murmur heard [No Carotid Bruits] : no carotid bruits [No Abdominal Bruit] : a ~M bruit was not heard ~T in the abdomen [No Varicosities] : no varicosities [Pedal Pulses Present] : the pedal pulses are present [No Edema] : there was no peripheral edema [No Palpable Aorta] : no palpable aorta [No Extremity Clubbing/Cyanosis] : no extremity clubbing/cyanosis [Soft] : abdomen soft [Non Tender] : non-tender [Non-distended] : non-distended [No Masses] : no abdominal mass palpated [No HSM] : no HSM [Normal Bowel Sounds] : normal bowel sounds [Normal Posterior Cervical Nodes] : no posterior cervical lymphadenopathy [Normal Anterior Cervical Nodes] : no anterior cervical lymphadenopathy [No CVA Tenderness] : no CVA  tenderness [No Spinal Tenderness] : no spinal tenderness [No Joint Swelling] : no joint swelling [Grossly Normal Strength/Tone] : grossly normal strength/tone [No Rash] : no rash [Coordination Grossly Intact] : coordination grossly intact [No Focal Deficits] : no focal deficits [Normal Gait] : normal gait [Normal Affect] : the affect was normal [Normal Insight/Judgement] : insight and judgment were intact [de-identified] : left ankle not swollen, nontender

## 2020-12-06 LAB — TSH SERPL-ACNC: 4.71 UIU/ML

## 2020-12-07 ENCOUNTER — TRANSCRIPTION ENCOUNTER (OUTPATIENT)
Age: 55
End: 2020-12-07

## 2020-12-08 ENCOUNTER — TRANSCRIPTION ENCOUNTER (OUTPATIENT)
Age: 55
End: 2020-12-08

## 2020-12-22 ENCOUNTER — APPOINTMENT (OUTPATIENT)
Dept: CARDIOLOGY | Facility: CLINIC | Age: 55
End: 2020-12-22
Payer: COMMERCIAL

## 2020-12-22 ENCOUNTER — NON-APPOINTMENT (OUTPATIENT)
Age: 55
End: 2020-12-22

## 2020-12-22 VITALS
DIASTOLIC BLOOD PRESSURE: 73 MMHG | OXYGEN SATURATION: 100 % | HEART RATE: 84 BPM | HEIGHT: 65 IN | SYSTOLIC BLOOD PRESSURE: 106 MMHG

## 2020-12-22 PROCEDURE — 99215 OFFICE O/P EST HI 40 MIN: CPT

## 2020-12-22 PROCEDURE — 99072 ADDL SUPL MATRL&STAF TM PHE: CPT

## 2020-12-22 PROCEDURE — 93000 ELECTROCARDIOGRAM COMPLETE: CPT

## 2020-12-29 ENCOUNTER — APPOINTMENT (OUTPATIENT)
Dept: CARDIOLOGY | Facility: CLINIC | Age: 55
End: 2020-12-29
Payer: COMMERCIAL

## 2020-12-29 ENCOUNTER — APPOINTMENT (OUTPATIENT)
Dept: CV DIAGNOSITCS | Facility: HOSPITAL | Age: 55
End: 2020-12-29

## 2020-12-29 ENCOUNTER — OUTPATIENT (OUTPATIENT)
Dept: OUTPATIENT SERVICES | Facility: HOSPITAL | Age: 55
LOS: 1 days | End: 2020-12-29
Payer: COMMERCIAL

## 2020-12-29 DIAGNOSIS — Z98.89 OTHER SPECIFIED POSTPROCEDURAL STATES: Chronic | ICD-10-CM

## 2020-12-29 DIAGNOSIS — S02.609A FRACTURE OF MANDIBLE, UNSPECIFIED, INITIAL ENCOUNTER FOR CLOSED FRACTURE: Chronic | ICD-10-CM

## 2020-12-29 DIAGNOSIS — R60.0 LOCALIZED EDEMA: ICD-10-CM

## 2020-12-29 PROCEDURE — 93306 TTE W/DOPPLER COMPLETE: CPT | Mod: 26

## 2020-12-29 PROCEDURE — 93306 TTE W/DOPPLER COMPLETE: CPT

## 2020-12-29 PROCEDURE — 97802 MEDICAL NUTRITION INDIV IN: CPT | Mod: 95

## 2021-01-07 ENCOUNTER — OUTPATIENT (OUTPATIENT)
Dept: OUTPATIENT SERVICES | Facility: HOSPITAL | Age: 56
LOS: 1 days | End: 2021-01-07
Payer: COMMERCIAL

## 2021-01-07 ENCOUNTER — APPOINTMENT (OUTPATIENT)
Dept: ULTRASOUND IMAGING | Facility: IMAGING CENTER | Age: 56
End: 2021-01-07
Payer: COMMERCIAL

## 2021-01-07 DIAGNOSIS — R60.0 LOCALIZED EDEMA: ICD-10-CM

## 2021-01-07 DIAGNOSIS — S02.609A FRACTURE OF MANDIBLE, UNSPECIFIED, INITIAL ENCOUNTER FOR CLOSED FRACTURE: Chronic | ICD-10-CM

## 2021-01-07 DIAGNOSIS — Z98.89 OTHER SPECIFIED POSTPROCEDURAL STATES: Chronic | ICD-10-CM

## 2021-01-07 PROCEDURE — 93971 EXTREMITY STUDY: CPT | Mod: 26,LT

## 2021-01-07 PROCEDURE — 93971 EXTREMITY STUDY: CPT

## 2021-01-08 ENCOUNTER — TRANSCRIPTION ENCOUNTER (OUTPATIENT)
Age: 56
End: 2021-01-08

## 2021-01-12 ENCOUNTER — RESULT REVIEW (OUTPATIENT)
Age: 56
End: 2021-01-12

## 2021-01-12 ENCOUNTER — APPOINTMENT (OUTPATIENT)
Dept: MRI IMAGING | Facility: CLINIC | Age: 56
End: 2021-01-12
Payer: COMMERCIAL

## 2021-01-12 ENCOUNTER — OUTPATIENT (OUTPATIENT)
Dept: OUTPATIENT SERVICES | Facility: HOSPITAL | Age: 56
LOS: 1 days | End: 2021-01-12
Payer: COMMERCIAL

## 2021-01-12 DIAGNOSIS — S02.609A FRACTURE OF MANDIBLE, UNSPECIFIED, INITIAL ENCOUNTER FOR CLOSED FRACTURE: Chronic | ICD-10-CM

## 2021-01-12 DIAGNOSIS — Z00.8 ENCOUNTER FOR OTHER GENERAL EXAMINATION: ICD-10-CM

## 2021-01-12 DIAGNOSIS — Z98.89 OTHER SPECIFIED POSTPROCEDURAL STATES: Chronic | ICD-10-CM

## 2021-01-12 PROCEDURE — 77049 MRI BREAST C-+ W/CAD BI: CPT | Mod: 26

## 2021-01-12 PROCEDURE — C8908: CPT

## 2021-01-12 PROCEDURE — A9585: CPT

## 2021-01-12 PROCEDURE — C8937: CPT

## 2021-01-27 ENCOUNTER — NON-APPOINTMENT (OUTPATIENT)
Age: 56
End: 2021-01-27

## 2021-01-27 ENCOUNTER — TRANSCRIPTION ENCOUNTER (OUTPATIENT)
Age: 56
End: 2021-01-27

## 2021-02-03 ENCOUNTER — EMERGENCY (EMERGENCY)
Facility: HOSPITAL | Age: 56
LOS: 1 days | Discharge: HOME | End: 2021-02-03
Attending: EMERGENCY MEDICINE
Payer: COMMERCIAL

## 2021-02-03 ENCOUNTER — APPOINTMENT (OUTPATIENT)
Dept: INTERNAL MEDICINE | Facility: CLINIC | Age: 56
End: 2021-02-03
Payer: COMMERCIAL

## 2021-02-03 VITALS
OXYGEN SATURATION: 97 % | RESPIRATION RATE: 16 BRPM | DIASTOLIC BLOOD PRESSURE: 89 MMHG | SYSTOLIC BLOOD PRESSURE: 119 MMHG | HEIGHT: 65 IN | HEART RATE: 100 BPM | TEMPERATURE: 99 F | WEIGHT: 179.9 LBS

## 2021-02-03 VITALS
HEIGHT: 65 IN | WEIGHT: 180 LBS | HEART RATE: 114 BPM | DIASTOLIC BLOOD PRESSURE: 77 MMHG | SYSTOLIC BLOOD PRESSURE: 111 MMHG | BODY MASS INDEX: 29.99 KG/M2 | OXYGEN SATURATION: 98 %

## 2021-02-03 VITALS
OXYGEN SATURATION: 98 % | SYSTOLIC BLOOD PRESSURE: 114 MMHG | TEMPERATURE: 99 F | RESPIRATION RATE: 17 BRPM | HEART RATE: 84 BPM | DIASTOLIC BLOOD PRESSURE: 80 MMHG

## 2021-02-03 DIAGNOSIS — Z98.89 OTHER SPECIFIED POSTPROCEDURAL STATES: Chronic | ICD-10-CM

## 2021-02-03 DIAGNOSIS — S02.609A FRACTURE OF MANDIBLE, UNSPECIFIED, INITIAL ENCOUNTER FOR CLOSED FRACTURE: Chronic | ICD-10-CM

## 2021-02-03 DIAGNOSIS — M53.3 SACROCOCCYGEAL DISORDERS, NOT ELSEWHERE CLASSIFIED: ICD-10-CM

## 2021-02-03 PROCEDURE — 99284 EMERGENCY DEPT VISIT MOD MDM: CPT | Mod: 25

## 2021-02-03 PROCEDURE — 99284 EMERGENCY DEPT VISIT MOD MDM: CPT

## 2021-02-03 PROCEDURE — 99072 ADDL SUPL MATRL&STAF TM PHE: CPT

## 2021-02-03 PROCEDURE — 99213 OFFICE O/P EST LOW 20 MIN: CPT

## 2021-02-03 PROCEDURE — 70450 CT HEAD/BRAIN W/O DYE: CPT

## 2021-02-03 PROCEDURE — 70450 CT HEAD/BRAIN W/O DYE: CPT | Mod: 26,MA

## 2021-02-03 RX ORDER — ACETAMINOPHEN 500 MG
975 TABLET ORAL ONCE
Refills: 0 | Status: COMPLETED | OUTPATIENT
Start: 2021-02-03 | End: 2021-02-03

## 2021-02-03 RX ADMIN — Medication 975 MILLIGRAM(S): at 15:38

## 2021-02-03 NOTE — ED PROVIDER NOTE - OBJECTIVE STATEMENT
56 y/o female presenting with mechanical fall, which happened at 6:50AM. Patient states she fell on ice hitting her tailbone first and then the back of her head. Patient states has a headache and tailbone pain. Denies any LOC or blood thinners. Acute onset, mild severity, no exacerbating or alleviating factors. Denies any nausea, vomiting, chest pain, shortness of breath, or other injuries.

## 2021-02-03 NOTE — HISTORY OF PRESENT ILLNESS
[FreeTextEntry8] : Presents after mechanical fall this morning while in parking lot of her physical therapist's office. She slipped on ice and fell down directly onto her coccyx. She then fell back onto the ground where her head hit the pavement. She managed to crawl to the door of the physical therapist's office. She was given ice and advised to see her PCP so she called to come in today. She continues to have pain in the back of her head but denies change in vision. She also denies focal weakness or paresthesias. She has no loss of bowel or bladder continence. She still feels pain in her coccyx area.

## 2021-02-03 NOTE — ED PROVIDER NOTE - PLAN OF CARE
You were found to have a concussion. Please give yourself brain rest by limiting screen time, getting adequate sleep, eating well and hydrating. You can take over the counter pain medications such as tylenol and motrin for your pain. For you coccyx bruise, please take over the counter pain medications and lidocaine patch as well. You can go to physical therapy as well for this. 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

## 2021-02-03 NOTE — ED PROVIDER NOTE - ATTENDING CONTRIBUTION TO CARE
Pt slipped and fell, hit tailbone and head, no LOC, no c/t/l spine td, minimal td buttock area without bruising, neuro intact.

## 2021-02-03 NOTE — ED PROVIDER NOTE - RAPID ASSESSMENT
55 y.o F s/p mechanical fall today at 6:50am. Denies head strike or syncope. Reports seeing stars after falling. Now c/o of 7/10 HA, tailbone pain, and dizziness. Denies any blood thinners or LOC.     Scribe Statement: Tri REDDY Tiffany, attest that this documentation has been prepared under the direction and in the presence of Miriam Shepherd) 55 y.o F s/p mechanical fall today at 6:50am. Denies head strike or syncope. Reports seeing stars after falling. Now c/o of 7/10 HA, tailbone pain, and dizziness. Denies any blood thinners or LOC.     Scribe Statement: I, Sarah Bartlett, attest that this documentation has been prepared under the direction and in the presence of Miriam Shepherd)    Miriam Shepherd - Attending Note: The scribe’s documentation has been prepared under my direction and personally reviewed by me.    Patient was rapidly assessed via Telemedicine encounter; a limited history and physical exam was performed. The patient will be seen and further worked up in the main emergency department and their care will be completed by the main emergency department team. Receiving team will follow up on labs, analgesia, any clinical imaging, and perform reassessment and disposition of the patient as clinically indicated. All decisions regarding the progression of care will be made at their discretion.

## 2021-02-03 NOTE — ED ADULT NURSE NOTE - NSIMPLEMENTINTERV_GEN_ALL_ED
Implemented All Fall Risk Interventions:  [place] to call system. Call bell, personal items and telephone within reach. Instruct patient to call for assistance. Room bathroom lighting operational. Non-slip footwear when patient is off stretcher. Physically safe environment: no spills, clutter or unnecessary equipment. Stretcher in lowest position, wheels locked, appropriate side rails in place. Provide visual cue, wrist band, yellow gown, etc. Monitor gait and stability. Monitor for mental status changes and reorient to person, place, and time. Review medications for side effects contributing to fall risk. Reinforce activity limits and safety measures with patient and family.

## 2021-02-03 NOTE — ED PROVIDER NOTE - PHYSICAL EXAMINATION
AAOx4  Follows commands  No dysarthria  No aphasia  PERRL, EOMI  CN II-XII grossly intact  No facial droop  Facial sensation intact B/L  Tongue midline  5/5 strength B/L UE and LE  Sensation intact BL UE and LE

## 2021-02-03 NOTE — ED PROVIDER NOTE - PATIENT PORTAL LINK FT
You can access the FollowMyHealth Patient Portal offered by [facility] by registering at the following website: [email or web address]. By joining [facility]’s FollowMyHealth portal, you will also be able to view your health information using other applications (apps) compatible with our system.

## 2021-02-03 NOTE — ED PROVIDER NOTE - CARE PLAN
Principal Discharge DX:	Concussion  Assessment and plan of treatment:	You were found to have a concussion. Please give yourself brain rest by limiting screen time, getting adequate sleep, eating well and hydrating. You can take over the counter pain medications such as tylenol and motrin for your pain. For you coccyx bruise, please take over the counter pain medications and lidocaine patch as well. You can go to physical therapy as well for this. 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  Secondary Diagnosis:	Contusion of coccyx, initial encounter

## 2021-02-03 NOTE — ED PROVIDER NOTE - NSFOLLOWUPINSTRUCTIONS_ED_ALL_ED_FT
You were found to have a concussion. Please give yourself brain rest by limiting screen time, getting adequate sleep, eating well and hydrating. You can take over the counter pain medications such as tylenol and motrin for your pain. For you coccyx bruise, please take over the counter pain medications and lidocaine patch as well. You can go to physical therapy as well for this. 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    CT Head:  No acute intracranial hemorrhage, mass effect, vasogenic edema, or evidence of acute territorial infarct.  There is age-appropriate cerebral volume loss.  The visualized paranasal sinuses are clear. The mastoid air cells and middle ear cavities are clear.  The globes are unremarkable.  The soft tissues of the scalp are unremarkable. The calvarium is intact. Incidental note of hyperostosis frontalis interna.    IMPRESSION:  No CT evidence of acute intracranial hemorrhage, brain edema, or mass effect.

## 2021-02-03 NOTE — PHYSICAL EXAM
[PERRL] : pupils equal round and reactive to light [EOMI] : extraocular movements intact [Grossly Normal Strength/Tone] : grossly normal strength/tone [Speech Grossly Normal] : speech grossly normal [Normal Affect] : the affect was normal [de-identified] : in pain, needs lights off in exam room because of photophobia [de-identified] : tenderness on coccyx [de-identified] : golf-ball sized extrcranial mass palpable on occipital region of head

## 2021-02-03 NOTE — ED PROVIDER NOTE - ADDITIONAL NOTES AND INSTRUCTIONS:
Please adopt limited work hours and screen time for the next two days. Please adopt limited work hours (4-6hrs) and screen time for the next two days.
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
## 2021-02-03 NOTE — ED PROVIDER NOTE - CARE PROVIDER_API CALL
Clau Alvarez)  Internal Medicine  [address] [place] [address], [address]  [place] [address]  Phone: [phone]  Fax: [phone]  Follow Up Time:

## 2021-02-03 NOTE — ED ADULT NURSE NOTE - OBJECTIVE STATEMENT
55  y.o F s/p mechanical fall today at 6:50am. Denies head strike or syncope. Reports seeing stars after falling. Now c/o of 7/10 HA, tailbone pain, and dizziness. Denies any blood thinners or LOC.

## 2021-02-03 NOTE — ED ADULT TRIAGE NOTE - CHIEF COMPLAINT QUOTE
mechanical fall 650am today  +hit head, denies LOC & blood thinner use  "I saw stars when I fell and now I am dizzy"

## 2021-02-05 ENCOUNTER — TRANSCRIPTION ENCOUNTER (OUTPATIENT)
Age: 56
End: 2021-02-05

## 2021-02-05 ENCOUNTER — APPOINTMENT (OUTPATIENT)
Dept: RADIOLOGY | Facility: CLINIC | Age: 56
End: 2021-02-05
Payer: COMMERCIAL

## 2021-02-05 ENCOUNTER — OUTPATIENT (OUTPATIENT)
Dept: OUTPATIENT SERVICES | Facility: HOSPITAL | Age: 56
LOS: 1 days | End: 2021-02-05
Payer: COMMERCIAL

## 2021-02-05 DIAGNOSIS — Z98.89 OTHER SPECIFIED POSTPROCEDURAL STATES: Chronic | ICD-10-CM

## 2021-02-05 DIAGNOSIS — S02.609A FRACTURE OF MANDIBLE, UNSPECIFIED, INITIAL ENCOUNTER FOR CLOSED FRACTURE: Chronic | ICD-10-CM

## 2021-02-05 DIAGNOSIS — M53.3 SACROCOCCYGEAL DISORDERS, NOT ELSEWHERE CLASSIFIED: ICD-10-CM

## 2021-02-05 PROCEDURE — 72220 X-RAY EXAM SACRUM TAILBONE: CPT

## 2021-02-05 PROCEDURE — 72220 X-RAY EXAM SACRUM TAILBONE: CPT | Mod: 26

## 2021-02-11 ENCOUNTER — TRANSCRIPTION ENCOUNTER (OUTPATIENT)
Age: 56
End: 2021-02-11

## 2021-02-17 ENCOUNTER — APPOINTMENT (OUTPATIENT)
Dept: RADIOLOGY | Facility: HOSPITAL | Age: 56
End: 2021-02-17

## 2021-02-17 ENCOUNTER — OUTPATIENT (OUTPATIENT)
Dept: OUTPATIENT SERVICES | Facility: HOSPITAL | Age: 56
LOS: 1 days | End: 2021-02-17
Payer: COMMERCIAL

## 2021-02-17 DIAGNOSIS — Z98.89 OTHER SPECIFIED POSTPROCEDURAL STATES: Chronic | ICD-10-CM

## 2021-02-17 DIAGNOSIS — R13.14 DYSPHAGIA, PHARYNGOESOPHAGEAL PHASE: ICD-10-CM

## 2021-02-17 DIAGNOSIS — S02.609A FRACTURE OF MANDIBLE, UNSPECIFIED, INITIAL ENCOUNTER FOR CLOSED FRACTURE: Chronic | ICD-10-CM

## 2021-02-17 PROCEDURE — 74221 X-RAY XM ESOPHAGUS 2CNTRST: CPT

## 2021-02-17 PROCEDURE — 74221 X-RAY XM ESOPHAGUS 2CNTRST: CPT | Mod: 26

## 2021-03-01 ENCOUNTER — APPOINTMENT (OUTPATIENT)
Dept: NEUROLOGY | Facility: CLINIC | Age: 56
End: 2021-03-01
Payer: COMMERCIAL

## 2021-03-01 VITALS
BODY MASS INDEX: 29.99 KG/M2 | SYSTOLIC BLOOD PRESSURE: 97 MMHG | HEART RATE: 83 BPM | WEIGHT: 180 LBS | HEIGHT: 65 IN | DIASTOLIC BLOOD PRESSURE: 64 MMHG

## 2021-03-01 DIAGNOSIS — H54.7 UNSPECIFIED VISUAL LOSS: ICD-10-CM

## 2021-03-01 DIAGNOSIS — R51.9 HEADACHE, UNSPECIFIED: ICD-10-CM

## 2021-03-01 DIAGNOSIS — R60.0 LOCALIZED EDEMA: ICD-10-CM

## 2021-03-01 DIAGNOSIS — F43.10 POST-TRAUMATIC STRESS DISORDER, UNSPECIFIED: ICD-10-CM

## 2021-03-01 DIAGNOSIS — R29.898 OTHER SYMPTOMS AND SIGNS INVOLVING THE MUSCULOSKELETAL SYSTEM: ICD-10-CM

## 2021-03-01 PROCEDURE — 99205 OFFICE O/P NEW HI 60 MIN: CPT

## 2021-03-01 PROCEDURE — 99072 ADDL SUPL MATRL&STAF TM PHE: CPT

## 2021-03-01 RX ORDER — LAMOTRIGINE 200 MG/1
200 TABLET, ORALLY DISINTEGRATING ORAL
Qty: 90 | Refills: 0 | Status: ACTIVE | COMMUNITY
Start: 2021-02-22

## 2021-03-01 RX ORDER — ESCITALOPRAM OXALATE 5 MG/5ML
5 SOLUTION ORAL DAILY
Refills: 0 | Status: ACTIVE | COMMUNITY
Start: 2021-02-03

## 2021-03-01 RX ORDER — CLONAZEPAM 0.5 MG/1
0.5 TABLET, ORALLY DISINTEGRATING ORAL
Qty: 15 | Refills: 0 | Status: DISCONTINUED | COMMUNITY
Start: 2020-12-30 | End: 2021-03-01

## 2021-03-01 RX ORDER — LAMOTRIGINE 25 MG/1
25 TABLET ORAL
Qty: 180 | Refills: 0 | Status: DISCONTINUED | COMMUNITY
Start: 2018-06-04 | End: 2021-03-01

## 2021-03-01 RX ORDER — MECLIZINE HYDROCHLORIDE 12.5 MG/1
12.5 TABLET ORAL
Qty: 20 | Refills: 0 | Status: DISCONTINUED | COMMUNITY
Start: 2021-02-05 | End: 2021-03-01

## 2021-03-01 RX ORDER — LAMOTRIGINE 150 MG/1
150 TABLET ORAL
Qty: 90 | Refills: 0 | Status: DISCONTINUED | COMMUNITY
Start: 2017-01-17 | End: 2021-03-01

## 2021-03-01 RX ORDER — LAMOTRIGINE 100 MG/1
100 TABLET, ORALLY DISINTEGRATING ORAL
Qty: 90 | Refills: 0 | Status: DISCONTINUED | COMMUNITY
Start: 2020-09-30 | End: 2021-03-01

## 2021-03-01 RX ORDER — LAMOTRIGINE 25 MG/1
25 TABLET ORAL
Qty: 42 | Refills: 0 | Status: DISCONTINUED | COMMUNITY
Start: 2020-12-09 | End: 2021-03-01

## 2021-03-01 NOTE — DATA REVIEWED
[de-identified] : Negative [de-identified] : CTh and MRI brain with MVD, no other lesions. \par \par USCD wnl.

## 2021-03-01 NOTE — PHYSICAL EXAM
[General Appearance - Alert] : alert [General Appearance - In No Acute Distress] : in no acute distress [Oriented To Time, Place, And Person] : oriented to person, place, and time [Impaired Insight] : insight and judgment were intact [Affect] : the affect was normal [Person] : oriented to person [Place] : oriented to place [Time] : oriented to time [Concentration Intact] : normal concentrating ability [Visual Intact] : visual attention was ~T not ~L decreased [Naming Objects] : no difficulty naming common objects [Repeating Phrases] : no difficulty repeating a phrase [Writing A Sentence] : no difficulty writing a sentence [Fluency] : fluency intact [Comprehension] : comprehension intact [Reading] : reading intact [Past History] : adequate knowledge of personal past history [Cranial Nerves Optic (II)] : visual acuity intact bilaterally,  visual fields full to confrontation, pupils equal round and reactive to light [Cranial Nerves Oculomotor (III)] : extraocular motion intact [Cranial Nerves Trigeminal (V)] : facial sensation intact symmetrically [Cranial Nerves Facial (VII)] : face symmetrical [Cranial Nerves Vestibulocochlear (VIII)] : hearing was intact bilaterally [Cranial Nerves Glossopharyngeal (IX)] : tongue and palate midline [Cranial Nerves Accessory (XI - Cranial And Spinal)] : head turning and shoulder shrug symmetric [Cranial Nerves Hypoglossal (XII)] : there was no tongue deviation with protrusion [Motor Strength] : muscle strength was normal in all four extremities [Involuntary Movements] : no involuntary movements were seen [No Muscle Atrophy] : normal bulk in all four extremities [Motor Handedness Left-Handed] : the patient is left hand dominant [Sensation Tactile Decrease] : light touch was intact [Sensation Vibration Decrease] : vibration was intact [Balance] : balance was intact [2+] : Ankle jerk left 2+ [Sclera] : the sclera and conjunctiva were normal [PERRL With Normal Accommodation] : pupils were equal in size, round, reactive to light, with normal accommodation [Extraocular Movements] : extraocular movements were intact [Optic Disc Abnormality] : the optic disc were normal in size and color [No APD] : no afferent pupillary defect [No ERINN] : no internuclear ophthalmoplegia [Full Visual Field] : full visual field [Outer Ear] : the ears and nose were normal in appearance [Oropharynx] : the oropharynx was normal [Neck Appearance] : the appearance of the neck was normal [Neck Cervical Mass (___cm)] : no neck mass was observed [Jugular Venous Distention Increased] : there was no jugular-venous distention [Thyroid Diffuse Enlargement] : the thyroid was not enlarged [Thyroid Nodule] : there were no palpable thyroid nodules [Auscultation Breath Sounds / Voice Sounds] : lungs were clear to auscultation bilaterally [Heart Rate And Rhythm] : heart rate was normal and rhythm regular [Heart Sounds] : normal S1 and S2 [Heart Sounds Gallop] : no gallops [Murmurs] : no murmurs [Heart Sounds Pericardial Friction Rub] : no pericardial rub [Arterial Pulses Carotid] : carotid pulses were normal with no bruits [Full Pulse] : the pedal pulses are present [Edema] : there was no peripheral edema [Bowel Sounds] : normal bowel sounds [Abdomen Soft] : soft [Abdomen Tenderness] : non-tender [Abdomen Mass (___ Cm)] : no abdominal mass palpated [No CVA Tenderness] : no ~M costovertebral angle tenderness [No Spinal Tenderness] : no spinal tenderness [Abnormal Walk] : normal gait [Nail Clubbing] : no clubbing  or cyanosis of the fingernails [Musculoskeletal - Swelling] : no joint swelling seen [Motor Tone] : muscle strength and tone were normal [Skin Color & Pigmentation] : normal skin color and pigmentation [Skin Turgor] : normal skin turgor [] : no rash [Motor Strength Upper Extremities Bilaterally] : strength was normal in both upper extremities [Motor Strength Lower Extremities Bilaterally] : strength was normal in both lower extremities [Romberg's Sign] : Romberg's sign was negtive [Dysesthesia] : no dysesthesia [Hyperesthesia] : no hyperesthesia [Past-pointing] : there was no past-pointing [Tremor] : no tremor present [Plantar Reflex Right Only] : normal on the right [Plantar Reflex Left Only] : normal on the left [FreeTextEntry4] : SAC: -2, -1, -1 (IM), -1 (recall): 25/30\par mBESS: several errors on one-leg\par  [FreeTextEntry5] : adjusting saccades at c/u test and pursuit; No skew; HOR and VOR ok. [FreeTextEntry9] : diffusion of DTR in LE, desensitizes, more startle-like [FreeTextEntry1] : stiff neck muscles
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
## 2021-05-05 ENCOUNTER — NON-APPOINTMENT (OUTPATIENT)
Age: 56
End: 2021-05-05

## 2021-05-13 ENCOUNTER — APPOINTMENT (OUTPATIENT)
Dept: INTERNAL MEDICINE | Facility: CLINIC | Age: 56
End: 2021-05-13
Payer: COMMERCIAL

## 2021-05-13 VITALS
DIASTOLIC BLOOD PRESSURE: 65 MMHG | HEIGHT: 65 IN | TEMPERATURE: 97.3 F | SYSTOLIC BLOOD PRESSURE: 94 MMHG | WEIGHT: 188 LBS | BODY MASS INDEX: 31.32 KG/M2 | OXYGEN SATURATION: 99 % | HEART RATE: 93 BPM

## 2021-05-13 DIAGNOSIS — B02.9 ZOSTER W/OUT COMPLICATIONS: ICD-10-CM

## 2021-05-13 PROCEDURE — 99072 ADDL SUPL MATRL&STAF TM PHE: CPT

## 2021-05-13 PROCEDURE — 99213 OFFICE O/P EST LOW 20 MIN: CPT

## 2021-05-13 RX ORDER — CYCLOBENZAPRINE HYDROCHLORIDE 5 MG/1
5 TABLET, FILM COATED ORAL
Qty: 10 | Refills: 0 | Status: DISCONTINUED | COMMUNITY
Start: 2021-03-01 | End: 2021-05-13

## 2021-05-13 NOTE — ASSESSMENT
[FreeTextEntry1] : Acute herpes zoster, resolving.\par Discomfort from rash- will send in Gabapentin- needs liquid, cannot swallow pills.\par d/c steroid crm, use Calamine gel.\par Feels fatigued- recheck TSH, had d/cd meds recenbtly, now resumed.\par \par EGD/[facility] in 2 wks, should be ok to proceed.

## 2021-05-13 NOTE — HISTORY OF PRESENT ILLNESS
[FreeTextEntry8] : Outbreak of H. Zoster ~ T5 dermatome left upper back around to chest/rib area. Went to [facility] care, took 7 dd course of Famcyclovir and using Betamethasone topical crm. \par Feeling very uncomfortable and worries about PHN. (googled). \par Of note, she cannot swallow pills and needs liquid meds.\par Having EGD/[place] 5/24 w Dr. Callahan.\par \par Fell 2/21, landed back of head on ice, fx coccyx and got concussion. Saw sev neurologists, most [place] Dr. [place] re ?stroke, (chr microvasc changes were seen on CT) stroke  zaldivar neg. Main Neuro is Dr. Norton on [address].\par On Terell 20 (liquid), Lamictal, Levothyrox (small pill). \par Advil pm prn at night. Trouble getting comfortable for sleeping.

## 2021-05-13 NOTE — PHYSICAL EXAM
[No Acute Distress] : no acute distress [Well Nourished] : well nourished [Well Developed] : well developed [Well-Appearing] : well-appearing [Normal Sclera/Conjunctiva] : normal sclera/conjunctiva [PERRL] : pupils equal round and reactive to light [EOMI] : extraocular movements intact [Normal Outer Ear/Nose] : the outer ears and nose were normal in appearance [Normal Oropharynx] : the oropharynx was normal [No JVD] : no jugular venous distention [No Lymphadenopathy] : no lymphadenopathy [Supple] : supple [Thyroid Normal, No Nodules] : the thyroid was normal and there were no nodules present [No Respiratory Distress] : no respiratory distress  [No Accessory Muscle Use] : no accessory muscle use [Clear to Auscultation] : lungs were clear to auscultation bilaterally [Normal Rate] : normal rate  [Regular Rhythm] : with a regular rhythm [Normal S1, S2] : normal S1 and S2 [No Murmur] : no murmur heard [No Carotid Bruits] : no carotid bruits [No Abdominal Bruit] : a ~M bruit was not heard ~T in the abdomen [No Varicosities] : no varicosities [Pedal Pulses Present] : the pedal pulses are present [No Edema] : there was no peripheral edema [No Palpable Aorta] : no palpable aorta [No Extremity Clubbing/Cyanosis] : no extremity clubbing/cyanosis [Soft] : abdomen soft [Non Tender] : non-tender [Non-distended] : non-distended [No Masses] : no abdominal mass palpated [No HSM] : no HSM [Normal Bowel Sounds] : normal bowel sounds [Normal Posterior Cervical Nodes] : no posterior cervical lymphadenopathy [Normal Anterior Cervical Nodes] : no anterior cervical lymphadenopathy [No CVA Tenderness] : no CVA  tenderness [No Spinal Tenderness] : no spinal tenderness [No Joint Swelling] : no joint swelling [Grossly Normal Strength/Tone] : grossly normal strength/tone [No Rash] : no rash [Coordination Grossly Intact] : coordination grossly intact [No Focal Deficits] : no focal deficits [Normal Gait] : normal gait [Deep Tendon Reflexes (DTR)] : deep tendon reflexes were 2+ and symmetric [Normal Affect] : the affect was normal [Normal Insight/Judgement] : insight and judgment were intact [de-identified] : T5 dermatome classic shingles rash, scabbing over

## 2021-05-14 LAB — TSH SERPL-ACNC: 2.86 UIU/ML

## 2021-06-23 ENCOUNTER — APPOINTMENT (OUTPATIENT)
Dept: CARDIOLOGY | Facility: CLINIC | Age: 56
End: 2021-06-23

## 2021-08-04 ENCOUNTER — APPOINTMENT (OUTPATIENT)
Dept: OBGYN | Facility: CLINIC | Age: 56
End: 2021-08-04
Payer: COMMERCIAL

## 2021-08-04 PROCEDURE — 77080 DXA BONE DENSITY AXIAL: CPT

## 2021-08-05 ENCOUNTER — APPOINTMENT (OUTPATIENT)
Dept: OBGYN | Facility: CLINIC | Age: 56
End: 2021-08-05
Payer: COMMERCIAL

## 2021-08-05 PROCEDURE — 77081 DXA BONE DENSITY APPENDICULR: CPT

## 2021-12-01 ENCOUNTER — APPOINTMENT (OUTPATIENT)
Dept: OBGYN | Facility: CLINIC | Age: 56
End: 2021-12-01
Payer: COMMERCIAL

## 2021-12-01 VITALS
DIASTOLIC BLOOD PRESSURE: 80 MMHG | SYSTOLIC BLOOD PRESSURE: 130 MMHG | WEIGHT: 190 LBS | BODY MASS INDEX: 31.65 KG/M2 | HEIGHT: 65 IN

## 2021-12-01 DIAGNOSIS — N63.10 UNSPECIFIED LUMP IN THE RIGHT BREAST, UNSPECIFIED QUADRANT: ICD-10-CM

## 2021-12-01 DIAGNOSIS — Z01.419 ENCOUNTER FOR GYNECOLOGICAL EXAMINATION (GENERAL) (ROUTINE) W/OUT ABNORMAL FINDINGS: ICD-10-CM

## 2021-12-01 PROCEDURE — 99213 OFFICE O/P EST LOW 20 MIN: CPT | Mod: 25

## 2021-12-01 PROCEDURE — 99396 PREV VISIT EST AGE 40-64: CPT

## 2021-12-01 PROCEDURE — 82270 OCCULT BLOOD FECES: CPT

## 2021-12-02 LAB — HPV HIGH+LOW RISK DNA PNL CVX: NOT DETECTED

## 2021-12-06 LAB — CYTOLOGY CVX/VAG DOC THIN PREP: NORMAL

## 2021-12-15 ENCOUNTER — ASOB RESULT (OUTPATIENT)
Age: 56
End: 2021-12-15

## 2021-12-15 ENCOUNTER — APPOINTMENT (OUTPATIENT)
Dept: OBGYN | Facility: CLINIC | Age: 56
End: 2021-12-15
Payer: COMMERCIAL

## 2021-12-15 PROCEDURE — 76830 TRANSVAGINAL US NON-OB: CPT

## 2021-12-16 ENCOUNTER — APPOINTMENT (OUTPATIENT)
Dept: MAMMOGRAPHY | Facility: CLINIC | Age: 56
End: 2021-12-16
Payer: COMMERCIAL

## 2021-12-16 ENCOUNTER — APPOINTMENT (OUTPATIENT)
Dept: ULTRASOUND IMAGING | Facility: CLINIC | Age: 56
End: 2021-12-16
Payer: COMMERCIAL

## 2021-12-16 ENCOUNTER — RESULT REVIEW (OUTPATIENT)
Age: 56
End: 2021-12-16

## 2021-12-16 PROCEDURE — 77066 DX MAMMO INCL CAD BI: CPT

## 2021-12-16 PROCEDURE — G0279: CPT

## 2021-12-16 PROCEDURE — 76641 ULTRASOUND BREAST COMPLETE: CPT | Mod: 50

## 2022-01-12 ENCOUNTER — APPOINTMENT (OUTPATIENT)
Dept: MRI IMAGING | Facility: CLINIC | Age: 57
End: 2022-01-12
Payer: COMMERCIAL

## 2022-01-12 ENCOUNTER — NON-APPOINTMENT (OUTPATIENT)
Age: 57
End: 2022-01-12

## 2022-01-12 ENCOUNTER — OUTPATIENT (OUTPATIENT)
Dept: OUTPATIENT SERVICES | Facility: HOSPITAL | Age: 57
LOS: 1 days | End: 2022-01-12
Payer: COMMERCIAL

## 2022-01-12 DIAGNOSIS — Z98.89 OTHER SPECIFIED POSTPROCEDURAL STATES: Chronic | ICD-10-CM

## 2022-01-12 DIAGNOSIS — Z00.8 ENCOUNTER FOR OTHER GENERAL EXAMINATION: ICD-10-CM

## 2022-01-12 DIAGNOSIS — S02.609A FRACTURE OF MANDIBLE, UNSPECIFIED, INITIAL ENCOUNTER FOR CLOSED FRACTURE: Chronic | ICD-10-CM

## 2022-01-12 DIAGNOSIS — N63.10 UNSPECIFIED LUMP IN THE RIGHT BREAST, UNSPECIFIED QUADRANT: ICD-10-CM

## 2022-01-12 PROCEDURE — A9585: CPT

## 2022-01-12 PROCEDURE — 77049 MRI BREAST C-+ W/CAD BI: CPT | Mod: 26

## 2022-01-12 PROCEDURE — C8908: CPT

## 2022-01-12 PROCEDURE — C8937: CPT

## 2022-10-12 ENCOUNTER — APPOINTMENT (OUTPATIENT)
Dept: SURGICAL ONCOLOGY | Facility: CLINIC | Age: 57
End: 2022-10-12

## 2022-10-12 VITALS
OXYGEN SATURATION: 97 % | HEIGHT: 65 IN | TEMPERATURE: 97.3 F | RESPIRATION RATE: 17 BRPM | BODY MASS INDEX: 33.32 KG/M2 | SYSTOLIC BLOOD PRESSURE: 116 MMHG | HEART RATE: 95 BPM | DIASTOLIC BLOOD PRESSURE: 74 MMHG | WEIGHT: 200 LBS

## 2022-10-12 PROCEDURE — 99214 OFFICE O/P EST MOD 30 MIN: CPT

## 2022-10-12 NOTE — PHYSICAL EXAM
[Normal] : supple, no neck mass and thyroid not enlarged [Normal Neck Lymph Nodes] : normal neck lymph nodes  [Normal Supraclavicular Lymph Nodes] : normal supraclavicular lymph nodes [Normal Groin Lymph Nodes] : normal groin lymph nodes [Normal Axillary Lymph Nodes] : normal axillary lymph nodes [Normal] : oriented to person, place and time, with appropriate affect [de-identified] : Left breast scar well healed. No masses or adenopathy bilaterally.

## 2022-10-12 NOTE — HISTORY OF PRESENT ILLNESS
[de-identified] : Patient is a 58 y/o female with strong family history presents for a follow up for annual breast exam. \par \par Breast MRI (1/12/22): No suspicious MRI correlate at the site of clinical area of concern per patient in the right upper outer breast. No MRI evidence of breast malignancy. Annual follow-up high risk screening breast MRI in one year is recommended. (BI-RADS 2)\par \par Lifetime risk calculated at 31% \par \par Bilateral mammo/sono performed on 12/16/21 showed right breast  at area of palpable concern, 9:00, 4 cm from nipple, there is a stable 0.4 cm cyst. Right 9:00, 13 cm from the nipple, benign-appearing lymph nodes are again noted. No mammographic or sonographic evidence of malignancy. (BI-RADS 2)\par \par Screening Bilateral MMG/US 6/22/20: No mammographic or sonographic evidence of malignancy. BI-RADS 2 \par Screening Bilateral MMG/US 6/6/2018: No mammographic or sonographic evidence of malignancy. BI-RADS 1 \par \par Notes she has right shoulder stiffness. Is seen by Dr. Sparrow. \par She reports one previous benign left breast excisional biopsy.\par FMHx: mother and maternal aunt had breast cancer.

## 2022-10-12 NOTE — ADDENDUM
[FreeTextEntry1] : I, Yuridia Miller, acted solely as a scribe for Dr. David Kate on this date 10/12/2022.\par

## 2022-10-12 NOTE — ASSESSMENT
[FreeTextEntry1] : Strong family history of breast cancer\par BI-RADS 2 MRI January 2022\par Normal PE\par Continue yearly breast mammogram/sonogram December 2022 and yearly MRI June 2023 \par RTO 6 months \par

## 2022-11-10 ENCOUNTER — APPOINTMENT (OUTPATIENT)
Dept: PAIN MANAGEMENT | Facility: CLINIC | Age: 57
End: 2022-11-10

## 2022-11-10 VITALS
SYSTOLIC BLOOD PRESSURE: 106 MMHG | HEART RATE: 72 BPM | DIASTOLIC BLOOD PRESSURE: 73 MMHG | HEIGHT: 64.5 IN | WEIGHT: 194 LBS | BODY MASS INDEX: 32.72 KG/M2

## 2022-11-10 DIAGNOSIS — G44.329 CHRONIC POST-TRAUMATIC HEADACHE, NOT INTRACTABLE: ICD-10-CM

## 2022-11-10 DIAGNOSIS — M47.812 SPONDYLOSIS W/OUT MYELOPATHY OR RADICULOPATHY, CERVICAL REGION: ICD-10-CM

## 2022-11-10 PROCEDURE — 99204 OFFICE O/P NEW MOD 45 MIN: CPT

## 2022-11-27 PROBLEM — G44.329 CHRONIC POST-TRAUMATIC HEADACHE, NOT INTRACTABLE: Status: ACTIVE | Noted: 2022-11-27

## 2022-11-27 PROBLEM — M47.812 CERVICAL SPONDYLOSIS: Status: ACTIVE | Noted: 2020-06-15

## 2022-11-27 NOTE — PHYSICAL EXAM
[General Appearance - Alert] : alert [General Appearance - In No Acute Distress] : in no acute distress [General Appearance - Well Nourished] : well nourished [General Appearance - Well Developed] : well developed [Oriented To Time, Place, And Person] : oriented to person, place, and time [Impaired Insight] : insight and judgment were intact [Affect] : the affect was normal [Memory Recent] : recent memory was not impaired [Person] : oriented to person [Place] : oriented to place [Time] : oriented to time [Short Term Intact] : short term memory intact [Registration Intact] : recent registration memory intact [Concentration Intact] : normal concentrating ability [Visual Intact] : visual attention was ~T not ~L decreased [Naming Objects] : no difficulty naming common objects [Writing A Sentence] : no difficulty writing a sentence [Fluency] : fluency intact [Comprehension] : comprehension intact [Reading] : reading intact [Current Events] : adequate knowledge of current events [Past History] : adequate knowledge of personal past history [Vocabulary] : adequate range of vocabulary [Cranial Nerves Oculomotor (III)] : extraocular motion intact [Cranial Nerves Hypoglossal (XII)] : there was no tongue deviation with protrusion [No Muscle Atrophy] : normal bulk in all four extremities [Motor Handedness Right-Handed] : the patient is right hand dominant [Motor Strength Upper Extremities Bilaterally] : strength was normal in both upper extremities [Sensation Tactile Decrease] : light touch was intact [Tremor] : no tremor present [Dysdiadochokinesia Bilaterally] : not present [FreeTextEntry5] : mild left ptosis, bilateral horizontal sustained nystagmus [Sclera] : the sclera and conjunctiva were normal [No ERINN] : no internuclear ophthalmoplegia [Strabismus] : no strabismus was seen [Outer Ear] : the ears and nose were normal in appearance [Neck Appearance] : the appearance of the neck was normal [Exaggerated Use Of Accessory Muscles For Inspiration] : no accessory muscle use [Edema] : there was no peripheral edema [Abnormal Walk] : normal gait [Nail Clubbing] : no clubbing  or cyanosis of the fingernails [Involuntary Movements] : no involuntary movements were seen [Skin Color & Pigmentation] : normal skin color and pigmentation [] : no rash

## 2022-11-27 NOTE — HISTORY OF PRESENT ILLNESS
[FreeTextEntry1] : Pt notes absolutely no history of headache until she had hat traumatic injury in 2/21 as detailed in Dr. Caicedo's note.  Notes that she "had never thought of herself as a headache sufferer" but had significant change in her life at that time.\par Had also noted nystagmus horizontally since injury.  had seen Dr. López who noted that this may or may not improved and she has noticed herself acclimatize to this over time\par Finds that she can see that sometimes on piece of paper with words moving.\par Does use glasses to drive without much change.\par No known migraine history in family but mother passed away early from breast CA.\par Had been seeing Dr. Norton who had tried her on nsaids, including advil for associated new onset of headache predominantly posterior.\par Even with the medication she would go to dark room and lay down and would be several hours until she could function.\par Does have MRI which showed UBOs and told possibly associated with stroke or migraine.\par Was given pantoprazole for stomach which she doesn't like because of osteopenia. [Chronic Headache] : chronic headache [Dizziness] : dizziness [Photophobia] : photophobia [Neck Pain] : neck pain [Scalp Tenderness] : scalp tenderness [> 4 hours] : > 4 hours [Stable] : The patient reports ~his/her~ symptoms since the last visit are stable [de-identified] : variable

## 2022-11-27 NOTE — ASSESSMENT
[FreeTextEntry1] : Has double vision on horizontal movement which worsens towards either side.  Mild left ptosis.\par referral to [facility] and [facility] sites\par consider use of antiinflammatory for 10 days\par consider vitamin therapy\par consider use of EMDR/ neuro optometry consult.

## 2022-11-27 NOTE — REVIEW OF SYSTEMS
[Fever] : no fever [Chills] : no chills [Feeling Poorly] : feeling poorly [Feeling Tired] : not feeling tired [Eyesight Problems] : eyesight problems [Nasal Discharge] : no nasal discharge [Chest Pain] : no chest pain [Cough] : no cough [Constipation] : no constipation [Arthralgias] : arthralgias [Neck Pain] : neck pain [As Noted in HPI] : as noted in HPI [Dizziness] : dizziness [Muscle Weakness] : no muscle weakness [Swollen Glands] : no swollen glands

## 2022-12-06 ENCOUNTER — APPOINTMENT (OUTPATIENT)
Dept: INTERNAL MEDICINE | Facility: CLINIC | Age: 57
End: 2022-12-06

## 2022-12-06 VITALS
HEIGHT: 64 IN | SYSTOLIC BLOOD PRESSURE: 103 MMHG | WEIGHT: 197 LBS | HEART RATE: 83 BPM | BODY MASS INDEX: 33.63 KG/M2 | DIASTOLIC BLOOD PRESSURE: 69 MMHG | TEMPERATURE: 96.5 F | OXYGEN SATURATION: 98 %

## 2022-12-06 DIAGNOSIS — E66.9 OBESITY, UNSPECIFIED: ICD-10-CM

## 2022-12-06 PROCEDURE — 99396 PREV VISIT EST AGE 40-64: CPT

## 2022-12-06 RX ORDER — GABAPENTIN 250 MG/5ML
250 SOLUTION ORAL DAILY
Qty: 50 | Refills: 3 | Status: DISCONTINUED | COMMUNITY
Start: 2021-05-13 | End: 2022-12-06

## 2022-12-06 NOTE — COUNSELING
[Fall prevention counseling provided] : Fall prevention counseling provided [Benefits of weight loss discussed] : Benefits of weight loss discussed

## 2022-12-07 LAB
25(OH)D3 SERPL-MCNC: 21.5 NG/ML
ALBUMIN SERPL ELPH-MCNC: 4.4 G/DL
ALP BLD-CCNC: 144 U/L
ALT SERPL-CCNC: 8 U/L
ANION GAP SERPL CALC-SCNC: 9 MMOL/L
AST SERPL-CCNC: 12 U/L
BASOPHILS # BLD AUTO: 0.07 K/UL
BASOPHILS NFR BLD AUTO: 0.9 %
BILIRUB SERPL-MCNC: 0.2 MG/DL
BUN SERPL-MCNC: 9 MG/DL
CALCIUM SERPL-MCNC: 9.3 MG/DL
CHLORIDE SERPL-SCNC: 106 MMOL/L
CHOLEST SERPL-MCNC: 231 MG/DL
CO2 SERPL-SCNC: 28 MMOL/L
CREAT SERPL-MCNC: 0.65 MG/DL
EGFR: 103 ML/MIN/1.73M2
EOSINOPHIL # BLD AUTO: 0.19 K/UL
EOSINOPHIL NFR BLD AUTO: 2.4 %
ESTIMATED AVERAGE GLUCOSE: 111 MG/DL
GLUCOSE SERPL-MCNC: 83 MG/DL
HBA1C MFR BLD HPLC: 5.5 %
HCT VFR BLD CALC: 42.7 %
HDLC SERPL-MCNC: 60 MG/DL
HGB BLD-MCNC: 13.4 G/DL
IMM GRANULOCYTES NFR BLD AUTO: 0.3 %
LDLC SERPL CALC-MCNC: 147 MG/DL
LYMPHOCYTES # BLD AUTO: 2.23 K/UL
LYMPHOCYTES NFR BLD AUTO: 28.5 %
MAN DIFF?: NORMAL
MCHC RBC-ENTMCNC: 28.9 PG
MCHC RBC-ENTMCNC: 31.4 GM/DL
MCV RBC AUTO: 92 FL
MONOCYTES # BLD AUTO: 0.67 K/UL
MONOCYTES NFR BLD AUTO: 8.6 %
NEUTROPHILS # BLD AUTO: 4.65 K/UL
NEUTROPHILS NFR BLD AUTO: 59.3 %
NONHDLC SERPL-MCNC: 171 MG/DL
PLATELET # BLD AUTO: 342 K/UL
POTASSIUM SERPL-SCNC: 4.3 MMOL/L
PROT SERPL-MCNC: 6.8 G/DL
RBC # BLD: 4.64 M/UL
RBC # FLD: 13.3 %
SODIUM SERPL-SCNC: 143 MMOL/L
TRIGL SERPL-MCNC: 121 MG/DL
TSH SERPL-ACNC: 1.68 UIU/ML
WBC # FLD AUTO: 7.83 K/UL

## 2022-12-08 NOTE — HEALTH RISK ASSESSMENT
[Good] : ~his/her~  mood as  good [Never] : Never [No] : No [0] : 2) Feeling down, depressed, or hopeless: Not at all (0) [PHQ-2 Negative - No further assessment needed] : PHQ-2 Negative - No further assessment needed [Patient reported colonoscopy was normal] : Patient reported colonoscopy was normal [Alone] : lives alone [Employed] : employed [Single] : single [Fully functional (bathing, dressing, toileting, transferring, walking, feeding)] : Fully functional (bathing, dressing, toileting, transferring, walking, feeding) [Fully functional (using the telephone, shopping, preparing meals, housekeeping, doing laundry, using] : Fully functional and needs no help or supervision to perform IADLs (using the telephone, shopping, preparing meals, housekeeping, doing laundry, using transportation, managing medications and managing finances) [Smoke Detector] : smoke detector [Safety elements used in home] : safety elements used in home [Seat Belt] :  uses seat belt [de-identified] : needs more, has been starting yoga, meditation [de-identified] : healthy- saw nutritionist, fish but no meat [[id number]] : 0 [Change in mental status noted] : No change in mental status noted [Language] : denies difficulty with language [Sexually Active] : not sexually active [High Risk Behavior] : no high risk behavior [Reports changes in hearing] : Reports no changes in hearing [Reports changes in vision] : Reports no changes in vision [Reports changes in dental health] : Reports no changes in dental health [ColonoscopyDate] : 07/21

## 2022-12-08 NOTE — HISTORY OF PRESENT ILLNESS
[FreeTextEntry1] : CPE [de-identified] : 58 yo woman w Htn, HLD, obesity,  Barrets esoph, hypothy, depr/PTSD/borderline\par O.penia, fibrocystic breasts, f/h Breast ca. mom, HA\par Pt fell 2/21 on black ice hit back of head had concussion seeing neurologists, neuro-optho; has fatigue, HA, brain fog, nystagmus.\par States she's finally getting some relief w Ubrelvy, Dr. Combs/[facility] specialist.\par \par GI Dr. Soy Escobar, EGD is due now; [place] 7/21 reports nl\par Msmmo/MRI uptodate  sees Breast surg Dr. Kate\par \par Had Flu shot, Covid vax x 5\par Shingrix will get at pharm\par Derm Dr. Penaloza uptodate\par Optho due\par \par Works for [employer]ing [employer]\par

## 2022-12-08 NOTE — ASSESSMENT
[FreeTextEntry1] : Pt as outlined.\par She is due for EGD, will call dr. Escobar.\par Optho referral\par routine labs, ate 3 hrs ago\par Ongoing care for HAs/concussion.\par \par Wt managmt\par Shingtix to pharm\par \par

## 2023-02-04 ENCOUNTER — EMERGENCY (EMERGENCY)
Facility: HOSPITAL | Age: 58
LOS: 1 days | Discharge: HOME | End: 2023-02-04
Attending: EMERGENCY MEDICINE | Admitting: EMERGENCY MEDICINE
Payer: COMMERCIAL

## 2023-02-04 VITALS
WEIGHT: 199.96 LBS | HEART RATE: 103 BPM | DIASTOLIC BLOOD PRESSURE: 76 MMHG | TEMPERATURE: 98 F | SYSTOLIC BLOOD PRESSURE: 127 MMHG | RESPIRATION RATE: 18 BRPM | OXYGEN SATURATION: 99 %

## 2023-02-04 DIAGNOSIS — S02.609A FRACTURE OF MANDIBLE, UNSPECIFIED, INITIAL ENCOUNTER FOR CLOSED FRACTURE: Chronic | ICD-10-CM

## 2023-02-04 DIAGNOSIS — Z98.89 OTHER SPECIFIED POSTPROCEDURAL STATES: Chronic | ICD-10-CM

## 2023-02-04 PROCEDURE — 90471 IMMUNIZATION ADMIN: CPT

## 2023-02-04 PROCEDURE — 73140 X-RAY EXAM OF FINGER(S): CPT

## 2023-02-04 PROCEDURE — 99284 EMERGENCY DEPT VISIT MOD MDM: CPT

## 2023-02-04 PROCEDURE — 73130 X-RAY EXAM OF HAND: CPT | Mod: 26,LT

## 2023-02-04 PROCEDURE — 99284 EMERGENCY DEPT VISIT MOD MDM: CPT | Mod: 25

## 2023-02-04 PROCEDURE — 90715 TDAP VACCINE 7 YRS/> IM: CPT

## 2023-02-04 PROCEDURE — 73140 X-RAY EXAM OF FINGER(S): CPT | Mod: 26,RT

## 2023-02-04 PROCEDURE — 73130 X-RAY EXAM OF HAND: CPT

## 2023-02-04 RX ORDER — TETANUS TOXOID, REDUCED DIPHTHERIA TOXOID AND ACELLULAR PERTUSSIS VACCINE, ADSORBED 5; 2.5; 8; 8; 2.5 [IU]/.5ML; [IU]/.5ML; UG/.5ML; UG/.5ML; UG/.5ML
0.5 SUSPENSION INTRAMUSCULAR ONCE
Refills: 0 | Status: COMPLETED | OUTPATIENT
Start: 2023-02-04 | End: 2023-02-04

## 2023-02-04 RX ADMIN — Medication 1 TABLET(S): at 20:34

## 2023-02-04 RX ADMIN — TETANUS TOXOID, REDUCED DIPHTHERIA TOXOID AND ACELLULAR PERTUSSIS VACCINE, ADSORBED 0.5 MILLILITER(S): 5; 2.5; 8; 8; 2.5 SUSPENSION INTRAMUSCULAR at 20:33

## 2023-02-04 NOTE — ED PROVIDER NOTE - NS ED ATTENDING STATEMENT MOD
This was a shared visit with the VERO. I reviewed and verified the documentation and independently performed the documented:

## 2023-02-04 NOTE — ED ADULT TRIAGE NOTE - SPO2 (%)
(TESSALON) 100 MG capsule     Sig: Take 1 capsule by mouth 3 times daily as needed for Cough     Dispense:  100 capsule     Refill:  0    predniSONE (DELTASONE) 10 MG tablet     Sig: Take 4 daily x 3 days, Take 3 tabs daily x 3 days, then 2 tabs daily x 3  days, then 1 tab daily x 3 days     Dispense:  30 tablet     Refill:  0    albuterol sulfate HFA (PROAIR HFA) 108 (90 Base) MCG/ACT inhaler     Sig: Inhale 2 puffs into the lungs every 6 hours as needed for Wheezing     Dispense:  1 Inhaler     Refill:  3    mometasone-formoterol (DULERA) 200-5 MCG/ACT inhaler     Sig: Inhale 2 puffs into the lungs 2 times daily     Dispense:  2 Inhaler     Refill:  0    tiotropium (SPIRIVA RESPIMAT) 2.5 MCG/ACT AERS inhaler     Sig: Inhale 2 puffs into the lungs daily     Dispense:  2 Inhaler     Refill:  0    levofloxacin (LEVAQUIN) 500 MG tablet     Sig: Take 1 tablet by mouth daily for 10 days     Dispense:  10 tablet     Refill:  0       Controlled Substances Monitoring:      Return in about 3 weeks (around 11/15/2017). I, Jennifer MOJICA. velma  , am scribing for and in the presence of Owen Jeffrey MD. Electronically signed by : Camelia Red MD, personally performed the services described in this documentation, as scribed by Jennifer MOJICA   in my presence, and it is both accurate and complete.  Electronically signed by: Owen Jeffrey MD    10/24/17 11:49 PM    Owen Jeffrey MD 99

## 2023-02-04 NOTE — ED ADULT NURSE NOTE - EAR DISTURBANCES
Addendum  created 11/25/20 145 by John Solis, CRNA    Attestation recorded in Intraprocedure, Intraprocedure Attestations filed      
normal

## 2023-02-04 NOTE — ED ADULT NURSE NOTE - NSICDXPASTSURGICALHX_GEN_ALL_CORE_FT
PAST SURGICAL HISTORY:  Jaw fracture left    S/P laparoscopic cholecystectomy     S/P lumpectomy, left breast benign

## 2023-02-04 NOTE — ED ADULT NURSE NOTE - OBJECTIVE STATEMENT
Pt was bitten around 7 p.m. by a poodle dog. The dog is up-to-date with shots. There is a puncture wound on left hand and it is bruised.

## 2023-02-04 NOTE — ED ADULT NURSE NOTE - CAS ELECT INFOMATION PROVIDED

## 2023-02-04 NOTE — ED PROVIDER NOTE - CARE PROVIDER_API CALL
[facility],   Phone: [phone]  Fax: (   )    -  Follow Up Time: 4-6 Days

## 2023-02-04 NOTE — ED PROVIDER NOTE - PATIENT PORTAL LINK FT
You can access the FollowMyHealth Patient Portal offered by [facility] by registering at the following website: [email or web address]. By joining [employer]’s FollowMyHealth portal, you will also be able to view your health information using other applications (apps) compatible with our system.

## 2023-02-04 NOTE — ED PROVIDER NOTE - OBJECTIVE STATEMENT
57-year-old female with no significant past medical history presents to the ED complaining of dog bite to left hand which occurred prior to arrival.  Patient explains that she is babysitting a friend's dog, the dog has not been feeling well, running around and bit her left hand.  Dog is up-to-date with vaccines.  Patient with unknown tetanus status.  Bleeding controlled prior to arrival.  Patient also complains of right second finger pain from dog bite as well however no open wound.  Denies fever, chills, chest pain, shortness of breath, abdominal pain, nausea, vomiting, upper or lower extremity weakness or paresthesias.

## 2023-02-04 NOTE — ED ADULT TRIAGE NOTE - CHIEF COMPLAINT QUOTE
57yr old female a&ox4 from home arrives to ED noted to have been bitten by dog to left hand, pt notes known dog and up to date with shots. Dressing noted to hand, no active bleeding noted at this time, pt noted to be able to move extremity without difficulty.

## 2023-02-04 NOTE — ED PROVIDER NOTE - PROVIDER TOKENS
FREE:[LAST:[[facility]PHONE:[phone]],FAX:[(   )    -],FOLLOWUP:[4-6 Days]]

## 2023-02-04 NOTE — ED PROVIDER NOTE - CLINICAL SUMMARY MEDICAL DECISION MAKING FREE TEXT BOX
Patient is a 57-year-old female who presents to the emergency room with a chief complaint of dog bite.  Past medical history of depression, hypothyroidism, Land's esophagus, history of headaches.  Patient reports that she is babysitting a friend's dog, the dog has not been feeling well, and she attempted to pick the dog up as it was running around and it bit her left hand.  Dog is up-to-date with vaccines.  Patient is unsure of the date of last tetanus.  Bleeding controlled prior to arrival.  She also reports small injury to her right second finger also from a dog bite.  Denies any numbness or tingling to the extremities denies any fevers chills nausea vomiting chest pain shortness of breath.  Patient is left-hand dominant.  Unsure of date of last tetanus.  On exam patient is sitting up no acute distress right hand second digit a small contusion is noted with no nailbed injury.  No bony tenderness to palpation full range of motion of digit sensation intact cap refill less than 2 seconds positive radial pulse.  Superficial puncture/abrasions noted to the left hand with no bony tenderness to palpation.  Full range of motion of all digits cap refill less than 2 seconds sensation grossly intact positive radial pulse.  Patient presenting to the emergency room after a dog bite to her left hand and right second digit.  Neurovascularly intact.  Will obtain x-rays update patient's tetanus clean wounds and began antibiotics.  At this time there is no lacerations to suture.  Patient advised that bites have a high risk of infection and therefore close prompt follow-up is required.  Strict return precautions provided.  Independent review of x-rays reveal no acute fracture.  Patient remained stable.  Can be discharged home.  Of note the dog that bit her was a toy poodle.

## 2023-02-07 ENCOUNTER — APPOINTMENT (OUTPATIENT)
Dept: INTERNAL MEDICINE | Facility: CLINIC | Age: 58
End: 2023-02-07
Payer: COMMERCIAL

## 2023-02-07 VITALS
HEIGHT: 64 IN | SYSTOLIC BLOOD PRESSURE: 114 MMHG | HEART RATE: 95 BPM | WEIGHT: 199 LBS | BODY MASS INDEX: 33.97 KG/M2 | TEMPERATURE: 97.1 F | OXYGEN SATURATION: 99 % | DIASTOLIC BLOOD PRESSURE: 73 MMHG

## 2023-02-07 DIAGNOSIS — M85.80 OTHER SPECIFIED DISORDERS OF BONE DENSITY AND STRUCTURE, UNSPECIFIED SITE: ICD-10-CM
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
## 2023-02-07 PROCEDURE — 99213 OFFICE O/P EST LOW 20 MIN: CPT

## 2023-02-07 NOTE — PHYSICAL EXAM
[No Acute Distress] : no acute distress [Well Nourished] : well nourished [Well Developed] : well developed [Well-Appearing] : well-appearing [Normal Sclera/Conjunctiva] : normal sclera/conjunctiva [PERRL] : pupils equal round and reactive to light [EOMI] : extraocular movements intact [Normal Outer Ear/Nose] : the outer ears and nose were normal in appearance [Normal Oropharynx] : the oropharynx was normal [No JVD] : no jugular venous distention [No Lymphadenopathy] : no lymphadenopathy [Supple] : supple [Thyroid Normal, No Nodules] : the thyroid was normal and there were no nodules present [No Respiratory Distress] : no respiratory distress  [No Accessory Muscle Use] : no accessory muscle use [Clear to Auscultation] : lungs were clear to auscultation bilaterally [Normal Rate] : normal rate  [Regular Rhythm] : with a regular rhythm [Normal S1, S2] : normal S1 and S2 [No Murmur] : no murmur heard [No Carotid Bruits] : no carotid bruits [No Abdominal Bruit] : a ~M bruit was not heard ~T in the abdomen [No Varicosities] : no varicosities [Pedal Pulses Present] : the pedal pulses are present [No Edema] : there was no peripheral edema [No Palpable Aorta] : no palpable aorta [No Extremity Clubbing/Cyanosis] : no extremity clubbing/cyanosis [Soft] : abdomen soft [Non Tender] : non-tender [Non-distended] : non-distended [No Masses] : no abdominal mass palpated [No HSM] : no HSM [Normal Bowel Sounds] : normal bowel sounds [Normal Posterior Cervical Nodes] : no posterior cervical lymphadenopathy [Normal Anterior Cervical Nodes] : no anterior cervical lymphadenopathy [No CVA Tenderness] : no CVA  tenderness [No Spinal Tenderness] : no spinal tenderness [No Joint Swelling] : no joint swelling [Grossly Normal Strength/Tone] : grossly normal strength/tone [No Rash] : no rash [Coordination Grossly Intact] : coordination grossly intact [No Focal Deficits] : no focal deficits [Normal Gait] : normal gait [Deep Tendon Reflexes (DTR)] : deep tendon reflexes were 2+ and symmetric [Normal Affect] : the affect was normal [Normal Insight/Judgement] : insight and judgment were intact [de-identified] : left hand volar area bite monster, clean and scabbed, no warmth, no swelling, one small red area which is not tender

## 2023-02-07 NOTE — ASSESSMENT
[FreeTextEntry1] : s/p Dog bite, on Augmentin and healing well.\par tdap done at ER.\par \par Pt had Shingrix #1  on 12/10 /22 at her pharm, #2  not yet due.\par will wait a few wks then go to her pharm for #2 and send record here.\par \par Will forward labs from Endo Dr. Cerda, I have not received any consult notes.

## 2023-02-07 NOTE — HISTORY OF PRESENT ILLNESS
[FreeTextEntry8] : 2/4/23 pt was bitten by her friend's dog- she was dogsitting-  toy poodle, has all shots (was at vet for pain).\par She went to [facility] where xray neg, had bite marks on left hand, is left-handed. Cleaned, had Tdap  and started on Augmentin 875 bid. Doing fine, area healing well.\par No pus, warmth or swelling, some slight erythema of the area.\par \par Pt sees Endo Dr. Audrey Cerda for thy ds and Openia, apparently had labs repeated and lipids were better.\par They were discussing meds for OP but pt about to go thru dental implants  (according to our Dex[facility] in '21 meds were not needed..).\par

## 2023-02-17 NOTE — REVIEW OF SYSTEMS
Patient : Hussein Pizarro Age: 63 year old Sex: male   MRN: [id number] Encounter Date: 2/17/2023    History     Chief Complaint   Patient presents with   • Hand Pain       HPI    Hussein Pizarro is a 63 year old presenting to the emergency department who visits ED frequently, here with h/o schizophrenia, HTn, anemia here with hand pain, feeling cold and hungry. Patient with legal guardian Suma Shafer    Reviewed [facility] note 12/7/22 and patient has legal guardian but is homeless and needs shelter placement    Alicia Ray L.C.S.W. - 12/07/2022 11:00 AM CST  Formatting of this note might be different from the original.  SW spoke to Pt.'s legal guardian- Suma Pizarro; she stated that the Pt is homeless. Her and her brother have tried for over 30 years to house him, and he continues to elope. She stated that he will call and check in with herself and his brother and that's about it. She stated to please discharge the Pt to a shelter.     Alicia Ray LCSW   II- Emergency Department   Call/Text [phone]  Pager #[phone]      No Known Allergies    No current facility-administered medications for this encounter.     Current Outpatient Medications   Medication Sig   • haloperidol decanoate (HALDOL DECANOATE) 100 MG/ML injectable solution Inject 2 mLs into the muscle every 28 days. Do not start before July 28, 2022.   • traZODone (DESYREL) 50 MG tablet Take 1 tablet by mouth nightly as needed for Sleep.   • amLODIPine (NORVASC) 5 MG tablet Take 1 tablet by mouth daily.       Past Medical History:   Diagnosis Date   • No pertinent past medical history        Past Surgical History:   Procedure Laterality Date   • Hernia repair         No family history on file.    Social History     Tobacco Use   • Smoking status: Never   • Smokeless tobacco: Never   Vaping Use   • Vaping Use: never used   Substance Use Topics   • Alcohol use: Not Currently   • Drug use: Not Currently       Review of Systems     Review  of Systems    Physical Exam     ED Triage Vitals [02/17/23 0253]   ED Triage Vitals Group      Temp 98.1 °F (36.7 °C)      Heart Rate 97      Resp 14      BP (!) 170/100      SpO2 98 %      EtCO2 mmHg       Height       Weight       Weight Scale Used       BMI (Calculated)       IBW/kg (Calculated)        Physical Exam      Procedures     Procedures    Lab Results     No results found for this visit on 02/17/23.    EKG         Radiology Results     Imaging Results    None         ED Medications     ED Medication Orders (From admission, onward)    None          ED Course     Vitals:    02/17/23 0253   BP: (!) 170/100   Pulse: 97   Resp: 14   Temp: 98.1 °F (36.7 °C)   SpO2: 98%              Medical Decision Making  62 yo male w multiple social needs and history of behavioral health concerns / limited access and compliance. Patient is cogent and able to state that he is here because it's cold outside, and he is hungry. Patient denies having acute medical concerns: specifically, no pain, no hallucinations, denies SI/HI. Patient spent 30+ minutes in the bathroom limiting my ability to assess him on arrival. I then approached and questioned him further about his needs and our ability to support him through shelter placement and food bank, however patient became upset that we are not offering to feed him and provide shelter here and requested to leave. Medical screening exam was negative for acute conditions requiring stabilization. Reassurance once again was provided and patient was allowed to leave.     Encounter for screening involving social determinants of health (SDoH): acute illness or injury  Risk  Diagnosis or treatment significantly limited by social determinants of health.                               Does the Patient have sepsis: NO     Critical Care               Disposition               There is no disposition no dispo time  There is no comment           Esperanza Hillman MD  02/22/23 1024     [Negative] : Heme/Lymph

## 2023-04-19 ENCOUNTER — APPOINTMENT (OUTPATIENT)
Dept: MAMMOGRAPHY | Facility: CLINIC | Age: 58
End: 2023-04-19
Payer: COMMERCIAL

## 2023-04-19 ENCOUNTER — OUTPATIENT (OUTPATIENT)
Dept: OUTPATIENT SERVICES | Facility: HOSPITAL | Age: 58
LOS: 1 days | End: 2023-04-19
Payer: COMMERCIAL

## 2023-04-19 ENCOUNTER — RESULT REVIEW (OUTPATIENT)
Age: 58
End: 2023-04-19

## 2023-04-19 ENCOUNTER — APPOINTMENT (OUTPATIENT)
Dept: ULTRASOUND IMAGING | Facility: CLINIC | Age: 58
End: 2023-04-19
Payer: COMMERCIAL

## 2023-04-19 DIAGNOSIS — Z00.8 ENCOUNTER FOR OTHER GENERAL EXAMINATION: ICD-10-CM

## 2023-04-19 DIAGNOSIS — Z98.89 OTHER SPECIFIED POSTPROCEDURAL STATES: Chronic | ICD-10-CM

## 2023-04-19 DIAGNOSIS — S02.609A FRACTURE OF MANDIBLE, UNSPECIFIED, INITIAL ENCOUNTER FOR CLOSED FRACTURE: Chronic | ICD-10-CM

## 2023-04-19 DIAGNOSIS — Z00.00 ENCOUNTER FOR GENERAL ADULT MEDICAL EXAMINATION WITHOUT ABNORMAL FINDINGS: ICD-10-CM

## 2023-04-19 PROCEDURE — 76641 ULTRASOUND BREAST COMPLETE: CPT | Mod: 26,50

## 2023-04-19 PROCEDURE — 77063 BREAST TOMOSYNTHESIS BI: CPT

## 2023-04-19 PROCEDURE — 77063 BREAST TOMOSYNTHESIS BI: CPT | Mod: 26

## 2023-04-19 PROCEDURE — 76641 ULTRASOUND BREAST COMPLETE: CPT

## 2023-04-19 PROCEDURE — 77067 SCR MAMMO BI INCL CAD: CPT

## 2023-04-19 PROCEDURE — 77067 SCR MAMMO BI INCL CAD: CPT | Mod: 26

## 2023-05-17 ENCOUNTER — APPOINTMENT (OUTPATIENT)
Dept: SURGICAL ONCOLOGY | Facility: CLINIC | Age: 58
End: 2023-05-17
Payer: COMMERCIAL

## 2023-05-27 ENCOUNTER — APPOINTMENT (OUTPATIENT)
Dept: MRI IMAGING | Facility: CLINIC | Age: 58
End: 2023-05-27
Payer: COMMERCIAL

## 2023-05-27 ENCOUNTER — OUTPATIENT (OUTPATIENT)
Dept: OUTPATIENT SERVICES | Facility: HOSPITAL | Age: 58
LOS: 1 days | End: 2023-05-27
Payer: COMMERCIAL

## 2023-05-27 DIAGNOSIS — N60.19 DIFFUSE CYSTIC MASTOPATHY OF UNSPECIFIED BREAST: ICD-10-CM

## 2023-05-27 DIAGNOSIS — S02.609A FRACTURE OF MANDIBLE, UNSPECIFIED, INITIAL ENCOUNTER FOR CLOSED FRACTURE: Chronic | ICD-10-CM

## 2023-05-27 DIAGNOSIS — Z00.8 ENCOUNTER FOR OTHER GENERAL EXAMINATION: ICD-10-CM

## 2023-05-27 DIAGNOSIS — Z98.89 OTHER SPECIFIED POSTPROCEDURAL STATES: Chronic | ICD-10-CM

## 2023-05-27 DIAGNOSIS — N63.10 UNSPECIFIED LUMP IN THE RIGHT BREAST, UNSPECIFIED QUADRANT: ICD-10-CM

## 2023-05-27 PROCEDURE — A9585: CPT

## 2023-05-27 PROCEDURE — 77049 MRI BREAST C-+ W/CAD BI: CPT | Mod: 26

## 2023-05-27 PROCEDURE — C8908: CPT

## 2023-05-27 PROCEDURE — C8937: CPT

## 2023-06-07 ENCOUNTER — APPOINTMENT (OUTPATIENT)
Dept: SURGICAL ONCOLOGY | Facility: CLINIC | Age: 58
End: 2023-06-07
Payer: COMMERCIAL

## 2023-06-07 VITALS
WEIGHT: 200 LBS | HEART RATE: 67 BPM | RESPIRATION RATE: 16 BRPM | DIASTOLIC BLOOD PRESSURE: 77 MMHG | OXYGEN SATURATION: 98 % | HEIGHT: 64 IN | BODY MASS INDEX: 34.15 KG/M2 | SYSTOLIC BLOOD PRESSURE: 155 MMHG

## 2023-06-07 PROCEDURE — 99214 OFFICE O/P EST MOD 30 MIN: CPT

## 2023-06-07 NOTE — ADDENDUM
[FreeTextEntry1] : I, Yuridia Miller, acted solely as a scribe for Dr. David Kate on this date 05/17/2023.

## 2023-06-07 NOTE — ASSESSMENT
[FreeTextEntry1] : Strong family history of breast cancer\par BI-RADS 2 MRI January 2022\par BIRADS 2 b/l Mammo/sono 4/2023\par Normal PE\par Continue yearly breast mammogram/sonogram April 2024 and yearly MRI October 2024 (ordered)\par RTO 6 months \par

## 2023-06-07 NOTE — HISTORY OF PRESENT ILLNESS
[de-identified] : Patient is a 58 y/o female with strong family history presents for a follow up for annual breast exam. \par \par Her recent bilateral mammo/sono performed on 4/13/23 showed no mammographic or sonographic evidence of malignancy. (BI-RADS 2).  Breast MRI performed 5/27/23 also with benign findings (BIRADS 1).\par \par Breast MRI (1/12/22): No suspicious MRI correlate at the site of clinical area of concern per patient in the right upper outer breast. No MRI evidence of breast malignancy. Annual follow-up high risk screening breast MRI in one year is recommended. (BI-RADS 2)\par \par Lifetime risk calculated at 31% \par \par Bilateral mammo/sono performed on 12/16/21 showed right breast  at area of palpable concern, 9:00, 4 cm from nipple, there is a stable 0.4 cm cyst. Right 9:00, 13 cm from the nipple, benign-appearing lymph nodes are again noted. No mammographic or sonographic evidence of malignancy. (BI-RADS 2)\par \par Screening Bilateral MMG/US 6/22/20: No mammographic or sonographic evidence of malignancy. BI-RADS 2 \par Screening Bilateral MMG/US 6/6/2018: No mammographic or sonographic evidence of malignancy. BI-RADS 1 \par \par Notes she has right shoulder stiffness. Is seen by Dr. Sparrow. \par She reports one previous benign left breast excisional biopsy.\par FMHx: mother and maternal aunt had breast cancer.

## 2023-06-20 ENCOUNTER — APPOINTMENT (OUTPATIENT)
Dept: PAIN MANAGEMENT | Facility: CLINIC | Age: 58
End: 2023-06-20

## 2023-08-21 ENCOUNTER — NON-APPOINTMENT (OUTPATIENT)
Age: 58
End: 2023-08-21

## 2023-12-07 ENCOUNTER — APPOINTMENT (OUTPATIENT)
Dept: PAIN MANAGEMENT | Facility: CLINIC | Age: 58
End: 2023-12-07

## 2023-12-08 ENCOUNTER — APPOINTMENT (OUTPATIENT)
Dept: INTERNAL MEDICINE | Facility: CLINIC | Age: 58
End: 2023-12-08
Payer: COMMERCIAL

## 2023-12-08 VITALS
OXYGEN SATURATION: 98 % | HEART RATE: 67 BPM | SYSTOLIC BLOOD PRESSURE: 110 MMHG | WEIGHT: 197 LBS | HEIGHT: 64 IN | RESPIRATION RATE: 16 BRPM | DIASTOLIC BLOOD PRESSURE: 74 MMHG | BODY MASS INDEX: 33.63 KG/M2

## 2023-12-08 DIAGNOSIS — E78.5 HYPERLIPIDEMIA, UNSPECIFIED: ICD-10-CM

## 2023-12-08 DIAGNOSIS — E03.9 HYPOTHYROIDISM, UNSPECIFIED: ICD-10-CM

## 2023-12-08 DIAGNOSIS — S06.0X0A CONCUSSION W/OUT LOSS OF CONSCIOUSNESS, INITIAL ENCOUNTER: ICD-10-CM

## 2023-12-08 DIAGNOSIS — Z00.00 ENCOUNTER FOR GENERAL ADULT MEDICAL EXAMINATION W/OUT ABNORMAL FINDINGS: ICD-10-CM

## 2023-12-08 DIAGNOSIS — F41.9 ANXIETY DISORDER, UNSPECIFIED: ICD-10-CM

## 2023-12-08 DIAGNOSIS — K22.70 BARRETT'S ESOPHAGUS W/OUT DYSPLASIA: ICD-10-CM

## 2023-12-08 DIAGNOSIS — I10 ESSENTIAL (PRIMARY) HYPERTENSION: ICD-10-CM

## 2023-12-08 DIAGNOSIS — F32.A ANXIETY DISORDER, UNSPECIFIED: ICD-10-CM

## 2023-12-08 PROCEDURE — 36415 COLL VENOUS BLD VENIPUNCTURE: CPT

## 2023-12-08 PROCEDURE — 99396 PREV VISIT EST AGE 40-64: CPT | Mod: 25

## 2023-12-12 LAB
25(OH)D3 SERPL-MCNC: 22 NG/ML
ALBUMIN SERPL ELPH-MCNC: 4.5 G/DL
ALP BLD-CCNC: 127 U/L
ALT SERPL-CCNC: 10 U/L
ANION GAP SERPL CALC-SCNC: 13 MMOL/L
AST SERPL-CCNC: 15 U/L
BILIRUB SERPL-MCNC: 0.2 MG/DL
BUN SERPL-MCNC: 16 MG/DL
CALCIUM SERPL-MCNC: 9.6 MG/DL
CHLORIDE SERPL-SCNC: 103 MMOL/L
CHOLEST SERPL-MCNC: 252 MG/DL
CO2 SERPL-SCNC: 26 MMOL/L
CREAT SERPL-MCNC: 0.82 MG/DL
EGFR: 83 ML/MIN/1.73M2
ESTIMATED AVERAGE GLUCOSE: 105 MG/DL
GLUCOSE SERPL-MCNC: 97 MG/DL
HBA1C MFR BLD HPLC: 5.3 %
HCT VFR BLD CALC: 40.3 %
HDLC SERPL-MCNC: 73 MG/DL
HGB BLD-MCNC: 12.7 G/DL
LDLC SERPL CALC-MCNC: 169 MG/DL
MCHC RBC-ENTMCNC: 28.2 PG
MCHC RBC-ENTMCNC: 31.5 GM/DL
MCV RBC AUTO: 89.6 FL
NONHDLC SERPL-MCNC: 179 MG/DL
PLATELET # BLD AUTO: 347 K/UL
POTASSIUM SERPL-SCNC: 4.7 MMOL/L
PROT SERPL-MCNC: 6.7 G/DL
RBC # BLD: 4.5 M/UL
RBC # FLD: 13.2 %
SODIUM SERPL-SCNC: 142 MMOL/L
TRIGL SERPL-MCNC: 64 MG/DL
TSH SERPL-ACNC: 2.74 UIU/ML
WBC # FLD AUTO: 7.02 K/UL

## 2023-12-16 ENCOUNTER — NON-APPOINTMENT (OUTPATIENT)
Age: 58
End: 2023-12-16

## 2024-05-19 ENCOUNTER — NON-APPOINTMENT (OUTPATIENT)
Age: 59
End: 2024-05-19

## 2024-06-05 ENCOUNTER — RESULT REVIEW (OUTPATIENT)
Age: 59
End: 2024-06-05

## 2024-06-06 ENCOUNTER — RESULT REVIEW (OUTPATIENT)
Age: 59
End: 2024-06-06

## 2024-06-06 ENCOUNTER — OUTPATIENT (OUTPATIENT)
Dept: OUTPATIENT SERVICES | Facility: HOSPITAL | Age: 59
LOS: 1 days | End: 2024-06-06
Payer: COMMERCIAL

## 2024-06-06 ENCOUNTER — APPOINTMENT (OUTPATIENT)
Dept: MAMMOGRAPHY | Facility: IMAGING CENTER | Age: 59
End: 2024-06-06
Payer: COMMERCIAL

## 2024-06-06 ENCOUNTER — APPOINTMENT (OUTPATIENT)
Dept: ULTRASOUND IMAGING | Facility: IMAGING CENTER | Age: 59
End: 2024-06-06
Payer: COMMERCIAL

## 2024-06-06 DIAGNOSIS — S02.609A FRACTURE OF MANDIBLE, UNSPECIFIED, INITIAL ENCOUNTER FOR CLOSED FRACTURE: Chronic | ICD-10-CM

## 2024-06-06 DIAGNOSIS — Z00.8 ENCOUNTER FOR OTHER GENERAL EXAMINATION: ICD-10-CM

## 2024-06-06 DIAGNOSIS — Z98.89 OTHER SPECIFIED POSTPROCEDURAL STATES: Chronic | ICD-10-CM

## 2024-06-06 DIAGNOSIS — N60.19 DIFFUSE CYSTIC MASTOPATHY OF UNSPECIFIED BREAST: ICD-10-CM

## 2024-06-06 PROCEDURE — 77063 BREAST TOMOSYNTHESIS BI: CPT | Mod: 26

## 2024-06-06 PROCEDURE — 77067 SCR MAMMO BI INCL CAD: CPT | Mod: 26

## 2024-06-06 PROCEDURE — 76641 ULTRASOUND BREAST COMPLETE: CPT | Mod: 26,50

## 2024-06-06 PROCEDURE — 76641 ULTRASOUND BREAST COMPLETE: CPT

## 2024-06-06 PROCEDURE — 77067 SCR MAMMO BI INCL CAD: CPT

## 2024-06-06 PROCEDURE — 77063 BREAST TOMOSYNTHESIS BI: CPT

## 2024-06-12 ENCOUNTER — APPOINTMENT (OUTPATIENT)
Dept: SURGICAL ONCOLOGY | Facility: CLINIC | Age: 59
End: 2024-06-12
Payer: COMMERCIAL

## 2024-06-12 VITALS
HEIGHT: 64 IN | OXYGEN SATURATION: 98 % | SYSTOLIC BLOOD PRESSURE: 108 MMHG | BODY MASS INDEX: 31.92 KG/M2 | HEART RATE: 75 BPM | DIASTOLIC BLOOD PRESSURE: 72 MMHG | WEIGHT: 187 LBS | RESPIRATION RATE: 17 BRPM

## 2024-06-12 DIAGNOSIS — N60.19 DIFFUSE CYSTIC MASTOPATHY OF UNSPECIFIED BREAST: ICD-10-CM

## 2024-06-12 PROCEDURE — 99214 OFFICE O/P EST MOD 30 MIN: CPT

## 2024-06-12 NOTE — HISTORY OF PRESENT ILLNESS
[de-identified] : Patient is a 57 y/o female with strong family history presents for a follow up for annual breast exam. Denies palpable breast masses, nipple discharge, nipple retraction/inversion. She is seeing derm for eczema on her left breast.   B/L mammo/sono 6/6/24 - No mammographic or sonographic evidence of malignancy. BIRADS 1 TC Lifetime Risk: 34.7%  Breast MRI 5/27/23- No MRI evidence of malignancy. BIRADS 1  mammo/sono performed on 4/13/23 showed no mammographic or sonographic evidence of malignancy. (BIRADS 2).  Breast MRI performed 5/27/23 also with benign findings (BIRADS 1).  Breast MRI (1/12/22): No suspicious MRI correlate at the site of clinical area of concern per patient in the right upper outer breast. No MRI evidence of breast malignancy. Annual follow-up high risk screening breast MRI in one year is recommended. (BI-RADS 2)  Lifetime risk calculated at 31%   Bilateral mammo/sono performed on 12/16/21 showed right breast  at area of palpable concern, 9:00, 4 cm from nipple, there is a stable 0.4 cm cyst. Right 9:00, 13 cm from the nipple, benign-appearing lymph nodes are again noted. No mammographic or sonographic evidence of malignancy. (BI-RADS 2)  Screening Bilateral MMG/US 6/22/20: No mammographic or sonographic evidence of malignancy. BI-RADS 2  Screening Bilateral MMG/US 6/6/2018: No mammographic or sonographic evidence of malignancy. BI-RADS 1   She reports one previous benign left breast excisional biopsy. FMHx: mother and maternal aunt had breast cancer.

## 2024-06-12 NOTE — ADDENDUM
[FreeTextEntry1] : I, Yuridia Miller, acted solely as a scribe for Dr. David Kate on this date 06/12/2024.

## 2024-06-12 NOTE — PHYSICAL EXAM
[Normal] : supple, no neck mass and thyroid not enlarged [Normal Neck Lymph Nodes] : normal neck lymph nodes  [Normal Supraclavicular Lymph Nodes] : normal supraclavicular lymph nodes [Normal Groin Lymph Nodes] : normal groin lymph nodes [Normal Axillary Lymph Nodes] : normal axillary lymph nodes [Normal] : oriented to person, place and time, with appropriate affect [de-identified] : Left breast scar well healed. No masses or adenopathy bilaterally.  [de-identified] : Mild eczema left lower chest below below IMF

## 2024-06-12 NOTE — ASSESSMENT
[FreeTextEntry1] : Strong family history of breast cancer BI-RADS 1 breast MRI 5/2023 BIRADS 1 mmg/us 6/2024  Normal PE Continue yearly breast imaging 5/2025 and yearly MRI October 2024  RTO 6 months

## 2024-09-04 ENCOUNTER — NON-APPOINTMENT (OUTPATIENT)
Age: 59
End: 2024-09-04

## 2024-10-02 DIAGNOSIS — N60.19 DIFFUSE CYSTIC MASTOPATHY OF UNSPECIFIED BREAST: ICD-10-CM

## 2024-11-14 ENCOUNTER — APPOINTMENT (OUTPATIENT)
Dept: INTERNAL MEDICINE | Facility: CLINIC | Age: 59
End: 2024-11-14
Payer: COMMERCIAL

## 2024-11-14 VITALS
BODY MASS INDEX: 31.41 KG/M2 | SYSTOLIC BLOOD PRESSURE: 98 MMHG | HEIGHT: 64 IN | WEIGHT: 184 LBS | HEART RATE: 72 BPM | TEMPERATURE: 98.5 F | OXYGEN SATURATION: 98 % | DIASTOLIC BLOOD PRESSURE: 64 MMHG

## 2024-11-14 DIAGNOSIS — J06.9 ACUTE UPPER RESPIRATORY INFECTION, UNSPECIFIED: ICD-10-CM

## 2024-11-14 PROCEDURE — G2211 COMPLEX E/M VISIT ADD ON: CPT | Mod: NC

## 2024-11-14 PROCEDURE — 99213 OFFICE O/P EST LOW 20 MIN: CPT

## 2024-11-14 RX ORDER — AZITHROMYCIN 250 MG/1
250 TABLET, FILM COATED ORAL
Qty: 1 | Refills: 0 | Status: ACTIVE | COMMUNITY
Start: 2024-11-14 | End: 1900-01-01

## 2024-11-14 RX ORDER — ALBUTEROL SULFATE 90 UG/1
108 (90 BASE) INHALANT RESPIRATORY (INHALATION)
Qty: 1 | Refills: 3 | Status: ACTIVE | COMMUNITY
Start: 2024-11-14 | End: 1900-01-01

## 2024-11-14 RX ORDER — INHALER,ASSIST DEV,SMALL MASK
SPACER (EA) MISCELLANEOUS
Qty: 1 | Refills: 0 | Status: ACTIVE | COMMUNITY
Start: 2024-11-14 | End: 1900-01-01

## 2024-11-15 ENCOUNTER — OUTPATIENT (OUTPATIENT)
Dept: OUTPATIENT SERVICES | Facility: HOSPITAL | Age: 59
LOS: 1 days | End: 2024-11-15
Payer: COMMERCIAL

## 2024-11-15 ENCOUNTER — APPOINTMENT (OUTPATIENT)
Dept: RADIOLOGY | Facility: IMAGING CENTER | Age: 59
End: 2024-11-15
Payer: COMMERCIAL

## 2024-11-15 DIAGNOSIS — Z98.89 OTHER SPECIFIED POSTPROCEDURAL STATES: Chronic | ICD-10-CM

## 2024-11-15 DIAGNOSIS — J06.9 ACUTE UPPER RESPIRATORY INFECTION, UNSPECIFIED: ICD-10-CM

## 2024-11-15 DIAGNOSIS — S02.609A FRACTURE OF MANDIBLE, UNSPECIFIED, INITIAL ENCOUNTER FOR CLOSED FRACTURE: Chronic | ICD-10-CM

## 2024-11-15 LAB
RAPID RVP RESULT: DETECTED
RV+EV RNA NPH QL NAA+NON-PROBE: DETECTED
SARS-COV-2 RNA NPH QL NAA+NON-PROBE: NOT DETECTED

## 2024-11-15 PROCEDURE — 71046 X-RAY EXAM CHEST 2 VIEWS: CPT | Mod: 26

## 2024-11-15 PROCEDURE — 71046 X-RAY EXAM CHEST 2 VIEWS: CPT

## 2024-12-06 NOTE — REASON FOR VISIT
Cancer Mother     Other Sister         MVA    Endometrial Cancer Sister     Hypertension Brother         3 brothers have HTN    Other Brother         Nehemiah had acute MI at age 47    Hypertension Maternal Grandfather     Stroke Maternal Grandfather     Ovarian Cancer Paternal Grandmother        Social History:  Social History     Socioeconomic History    Marital status:      Spouse name: None    Number of children: None    Years of education: None    Highest education level: None   Tobacco Use    Smoking status: Never    Smokeless tobacco: Never   Substance and Sexual Activity    Alcohol use: No    Drug use: No    Sexual activity: Yes     Partners: Male     Social Determinants of Health     Financial Resource Strain: Low Risk  (6/5/2024)    Overall Financial Resource Strain (CARDIA)     Difficulty of Paying Living Expenses: Not hard at all   Food Insecurity: No Food Insecurity (9/17/2024)    Hunger Vital Sign     Worried About Running Out of Food in the Last Year: Never true     Ran Out of Food in the Last Year: Never true   Transportation Needs: No Transportation Needs (9/17/2024)    PRAPARE - Transportation     Lack of Transportation (Medical): No     Lack of Transportation (Non-Medical): No    Received from [facility]    Interpersonal Safety   Housing Stability: Low Risk  (9/17/2024)    Housing Stability Vital Sign     Unable to Pay for Housing in the Last Year: No     Number of Times Moved in the Last Year: 0     Homeless in the Last Year: No         Allergies:  Ace inhibitors, Seasonal, and Codeine    Current Medications:    Prior to Admission medications    Medication Sig Start Date End Date Taking? Authorizing Provider   triamterene-hydroCHLOROthiazide (DYAZIDE) 37.5-25 MG per capsule TAKE 1 CAPSULE BY MOUTH DAILY 12/4/24  Yes Diony Red MD   clonazePAM (KLONOPIN) 1 MG tablet TAKE 1 TABLET BY MOUTH EVERY NIGHT AT BEDTIME 12/4/24  [Initial Evaluation] : an initial evaluation

## 2024-12-18 ENCOUNTER — OUTPATIENT (OUTPATIENT)
Dept: OUTPATIENT SERVICES | Facility: HOSPITAL | Age: 59
LOS: 1 days | End: 2024-12-18
Payer: COMMERCIAL

## 2024-12-18 ENCOUNTER — APPOINTMENT (OUTPATIENT)
Dept: SURGICAL ONCOLOGY | Facility: CLINIC | Age: 59
End: 2024-12-18

## 2024-12-18 ENCOUNTER — APPOINTMENT (OUTPATIENT)
Dept: MRI IMAGING | Facility: IMAGING CENTER | Age: 59
End: 2024-12-18
Payer: COMMERCIAL

## 2024-12-18 DIAGNOSIS — S02.609A FRACTURE OF MANDIBLE, UNSPECIFIED, INITIAL ENCOUNTER FOR CLOSED FRACTURE: Chronic | ICD-10-CM

## 2024-12-18 DIAGNOSIS — Z98.89 OTHER SPECIFIED POSTPROCEDURAL STATES: Chronic | ICD-10-CM

## 2024-12-18 DIAGNOSIS — Z00.8 ENCOUNTER FOR OTHER GENERAL EXAMINATION: ICD-10-CM

## 2024-12-18 PROCEDURE — 77049 MRI BREAST C-+ W/CAD BI: CPT | Mod: 26

## 2024-12-18 PROCEDURE — C8937: CPT

## 2024-12-18 PROCEDURE — C8908: CPT

## 2024-12-18 PROCEDURE — A9585: CPT

## 2025-01-02 PROBLEM — Z91.89 INCREASED RISK OF BREAST CANCER: Status: ACTIVE | Noted: 2025-01-02

## 2025-01-03 ENCOUNTER — APPOINTMENT (OUTPATIENT)
Dept: SURGICAL ONCOLOGY | Facility: CLINIC | Age: 60
End: 2025-01-03
Payer: COMMERCIAL

## 2025-01-03 VITALS
BODY MASS INDEX: 31.41 KG/M2 | WEIGHT: 184 LBS | HEIGHT: 64 IN | OXYGEN SATURATION: 98 % | DIASTOLIC BLOOD PRESSURE: 70 MMHG | SYSTOLIC BLOOD PRESSURE: 120 MMHG | HEART RATE: 80 BPM

## 2025-01-03 DIAGNOSIS — Z91.89 OTHER SPECIFIED PERSONAL RISK FACTORS, NOT ELSEWHERE CLASSIFIED: ICD-10-CM

## 2025-01-03 PROCEDURE — 99214 OFFICE O/P EST MOD 30 MIN: CPT

## 2025-03-11 NOTE — ADDENDUM
[FreeTextEntry1] : I, Minesh Valenzuela, acted soley as a scribe for Dr. David Kate on this date 07/29/2020.\par 
electronic

## 2025-05-10 ENCOUNTER — EMERGENCY (EMERGENCY)
Facility: HOSPITAL | Age: 60
LOS: 1 days | End: 2025-05-10
Attending: STUDENT IN AN ORGANIZED HEALTH CARE EDUCATION/TRAINING PROGRAM | Admitting: STUDENT IN AN ORGANIZED HEALTH CARE EDUCATION/TRAINING PROGRAM
Payer: COMMERCIAL

## 2025-05-10 VITALS
HEART RATE: 85 BPM | HEIGHT: 65 IN | RESPIRATION RATE: 18 BRPM | DIASTOLIC BLOOD PRESSURE: 82 MMHG | OXYGEN SATURATION: 99 % | WEIGHT: 179.9 LBS | SYSTOLIC BLOOD PRESSURE: 130 MMHG | TEMPERATURE: 98 F

## 2025-05-10 DIAGNOSIS — Z98.89 OTHER SPECIFIED POSTPROCEDURAL STATES: Chronic | ICD-10-CM

## 2025-05-10 DIAGNOSIS — S02.609A FRACTURE OF MANDIBLE, UNSPECIFIED, INITIAL ENCOUNTER FOR CLOSED FRACTURE: Chronic | ICD-10-CM

## 2025-05-10 PROCEDURE — 99284 EMERGENCY DEPT VISIT MOD MDM: CPT

## 2025-05-10 RX ORDER — PREDNISONE 20 MG/1
2 TABLET ORAL
Qty: 8 | Refills: 0
Start: 2025-05-10 | End: 2025-05-13

## 2025-05-10 RX ORDER — PREDNISONE 20 MG/1
50 TABLET ORAL ONCE
Refills: 0 | Status: COMPLETED | OUTPATIENT
Start: 2025-05-10 | End: 2025-05-10

## 2025-05-10 RX ORDER — DIPHENHYDRAMINE HCL 12.5MG/5ML
50 ELIXIR ORAL ONCE
Refills: 0 | Status: COMPLETED | OUTPATIENT
Start: 2025-05-10 | End: 2025-05-10

## 2025-05-10 NOTE — ED PROVIDER NOTE - NSFOLLOWUPINSTRUCTIONS_ED_ALL_ED_FT
Take Prednisone once daily for 4 more days    Use over the counter Benadryl and Pepcid as needed for recurrence of symptoms. Follow the directions on the medication label for how much/how often to take these medicines    If you feel any wheezing or shortness of breath, use the Epipen that was sent to your pharmacy and call 911 for evaluation in a hospital    Follow up with your primary care doctor and an Allergist in 2-3 days for further evaluation of your symptoms       Allergic Reaction    An allergic reaction is an abnormal reaction to a substance (allergen) by the body's defense system. Common allergens include medicines, food, insect bites or stings, and blood products. The body releases certain proteins into the blood that can cause a variety of symptoms such as an itchy rash, wheezing, swelling of the face/lips/tongue/throat, abdominal pain, nausea or vomiting. An allergic reaction is usually treated with medication. If your health care provider prescribed you an epinephrine injection device, make sure to keep it with you at all times.    SEEK IMMEDIATE MEDICAL CARE IF YOU HAVE ANY OF THE FOLLOWING SYMPTOMS: allergic reaction severe enough that required you to use epinephrine, tightness in your chest, swelling around your lips/tongue/throat, abdominal pain, vomiting or diarrhea, or lightheadedness/dizziness. These symptoms may represent a serious problem that is an emergency. Do not wait to see if the symptoms will go away. Use your auto-injector pen or anaphylaxis kit as you have been instructed. Call 911 and do not drive yourself to the hospital.

## 2025-05-10 NOTE — ED PROVIDER NOTE - PATIENT PORTAL LINK FT
You can access the FollowMyHealth Patient Portal offered by [facility] by registering at the following website: [email or web address]. By joining [employer]’s FollowMyHealth portal, you will also be able to view your health information using other applications (apps) compatible with our system.

## 2025-05-10 NOTE — ED PROVIDER NOTE - CLINICAL SUMMARY MEDICAL DECISION MAKING FREE TEXT BOX
59F PMH anxiety/depression, GERD p/w allergic reaction. Pt states she was stung by a hornet on her L middle finger about 30 mins ago. She felt tingling to her lips/tongue. No rash, urticaria, SOB, difficulty swallowing saliva, vomiting, diarrhea    Gen: NAD, non-toxic appearing, awake alert   HEENT: no tonsillar/uvular swelling, no stridor, normal conjunctiva, oral mucosa moist  Lung: CTAB, no respiratory distress, no wheezes/rhonchi/rales B/L, speaking in full sentences  CV: RRR  Abd: soft, NT, ND, no guarding, no rigidity, no rebound tenderness  MSK: no visible deformities, ROM normal in UE/LE  Skin: Warm, well perfused, no urticaria    Likely simple allergic rxn to hornet sting. No evidence of anaphylaxis or airway compromise  Will treat with prednisone/benadryl/pepcid, dc with urgent pcp and allergist f/u. explained to pt and strict return precautions given. all quests answered

## 2025-05-10 NOTE — ED ADULT NURSE NOTE - OBJECTIVE STATEMENT
59 y.o female presents to ED with chief complaint of hornet sting to left 3rd finger. Swelling noted to affected finger, ice applied

## 2025-05-11 VITALS
SYSTOLIC BLOOD PRESSURE: 126 MMHG | TEMPERATURE: 98 F | RESPIRATION RATE: 18 BRPM | OXYGEN SATURATION: 99 % | HEART RATE: 82 BPM | DIASTOLIC BLOOD PRESSURE: 76 MMHG

## 2025-05-11 PROCEDURE — 99283 EMERGENCY DEPT VISIT LOW MDM: CPT

## 2025-05-11 RX ADMIN — Medication 20 MILLIGRAM(S): at 00:02

## 2025-05-11 RX ADMIN — PREDNISONE 50 MILLIGRAM(S): 20 TABLET ORAL at 00:02

## 2025-05-11 RX ADMIN — Medication 50 MILLIGRAM(S): at 00:02

## 2025-05-19 ENCOUNTER — APPOINTMENT (OUTPATIENT)
Dept: INTERNAL MEDICINE | Facility: CLINIC | Age: 60
End: 2025-05-19
Payer: COMMERCIAL

## 2025-05-19 VITALS
DIASTOLIC BLOOD PRESSURE: 68 MMHG | HEART RATE: 99 BPM | BODY MASS INDEX: 33.46 KG/M2 | TEMPERATURE: 97.6 F | OXYGEN SATURATION: 98 % | HEIGHT: 64 IN | WEIGHT: 196 LBS | SYSTOLIC BLOOD PRESSURE: 100 MMHG

## 2025-05-19 PROCEDURE — 99213 OFFICE O/P EST LOW 20 MIN: CPT

## 2025-05-19 RX ORDER — FLUTICASONE PROPIONATE 50 UG/1
50 SPRAY, METERED NASAL TWICE DAILY
Qty: 1 | Refills: 1 | Status: ACTIVE | COMMUNITY
Start: 2025-05-19 | End: 1900-01-01

## 2025-05-19 RX ORDER — BENZONATATE 200 MG/1
200 CAPSULE ORAL 3 TIMES DAILY
Qty: 30 | Refills: 0 | Status: ACTIVE | COMMUNITY
Start: 2025-05-19 | End: 1900-01-01

## 2025-05-20 LAB
INFLUENZA A RESULT: NOT DETECTED
INFLUENZA B RESULT: NOT DETECTED
RESP SYN VIRUS RESULT: NOT DETECTED
SARS-COV-2 RESULT: NOT DETECTED